# Patient Record
Sex: FEMALE | Race: OTHER | Employment: FULL TIME | URBAN - METROPOLITAN AREA
[De-identification: names, ages, dates, MRNs, and addresses within clinical notes are randomized per-mention and may not be internally consistent; named-entity substitution may affect disease eponyms.]

---

## 2017-12-31 ENCOUNTER — APPOINTMENT (OUTPATIENT)
Dept: GENERAL RADIOLOGY | Age: 57
End: 2017-12-31
Attending: EMERGENCY MEDICINE
Payer: COMMERCIAL

## 2017-12-31 ENCOUNTER — HOSPITAL ENCOUNTER (EMERGENCY)
Age: 57
Discharge: HOME | End: 2017-12-31
Attending: EMERGENCY MEDICINE
Payer: COMMERCIAL

## 2017-12-31 VITALS
WEIGHT: 205.8 LBS | HEIGHT: 60 IN | RESPIRATION RATE: 20 BRPM | OXYGEN SATURATION: 99 % | SYSTOLIC BLOOD PRESSURE: 145 MMHG | DIASTOLIC BLOOD PRESSURE: 75 MMHG | HEART RATE: 100 BPM | TEMPERATURE: 100 F | BODY MASS INDEX: 40.4 KG/M2

## 2017-12-31 DIAGNOSIS — J41.0 SIMPLE CHRONIC BRONCHITIS (HCC): Primary | ICD-10-CM

## 2017-12-31 LAB
ALBUMIN SERPL-MCNC: 3.9 G/DL (ref 3.5–5)
ALBUMIN/GLOB SERPL: 0.9 {RATIO} (ref 1.1–2.2)
ALP SERPL-CCNC: 108 U/L (ref 45–117)
ALT SERPL-CCNC: <6 U/L (ref 12–78)
ANION GAP SERPL CALC-SCNC: 10 MMOL/L (ref 5–15)
AST SERPL-CCNC: 41 U/L (ref 15–37)
BASOPHILS # BLD: 0 K/UL (ref 0–0.1)
BASOPHILS NFR BLD: 0 % (ref 0–1)
BILIRUB SERPL-MCNC: 0.4 MG/DL (ref 0.2–1)
BUN SERPL-MCNC: 11 MG/DL (ref 6–20)
BUN/CREAT SERPL: 14 (ref 12–20)
CALCIUM SERPL-MCNC: 8.9 MG/DL (ref 8.5–10.1)
CHLORIDE SERPL-SCNC: 101 MMOL/L (ref 97–108)
CO2 SERPL-SCNC: 22 MMOL/L (ref 21–32)
CREAT SERPL-MCNC: 0.76 MG/DL (ref 0.55–1.02)
DIFFERENTIAL METHOD BLD: ABNORMAL
EOSINOPHIL # BLD: 0.2 K/UL (ref 0–0.4)
EOSINOPHIL NFR BLD: 3 % (ref 0–7)
ERYTHROCYTE [DISTWIDTH] IN BLOOD BY AUTOMATED COUNT: 13.9 % (ref 11.5–14.5)
FLUAV AG NPH QL IA: NEGATIVE
FLUBV AG NOSE QL IA: NEGATIVE
GLOBULIN SER CALC-MCNC: 4.5 G/DL (ref 2–4)
GLUCOSE SERPL-MCNC: 98 MG/DL (ref 65–100)
HCT VFR BLD AUTO: 44 % (ref 35–47)
HGB BLD-MCNC: 14.7 G/DL (ref 11.5–16)
LYMPHOCYTES # BLD: 0.5 K/UL (ref 0.8–3.5)
LYMPHOCYTES NFR BLD: 7 % (ref 12–49)
MCH RBC QN AUTO: 29 PG (ref 26–34)
MCHC RBC AUTO-ENTMCNC: 33.4 G/DL (ref 30–36.5)
MCV RBC AUTO: 86.8 FL (ref 80–99)
MONOCYTES # BLD: 0.7 K/UL (ref 0–1)
MONOCYTES NFR BLD: 10 % (ref 5–13)
NEUTS SEG # BLD: 5.7 K/UL (ref 1.8–8)
NEUTS SEG NFR BLD: 80 % (ref 32–75)
PLATELET # BLD AUTO: 204 K/UL (ref 150–400)
POTASSIUM SERPL-SCNC: 4 MMOL/L (ref 3.5–5.1)
PROT SERPL-MCNC: 8.4 G/DL (ref 6.4–8.2)
RBC # BLD AUTO: 5.07 M/UL (ref 3.8–5.2)
RBC MORPH BLD: ABNORMAL
SODIUM SERPL-SCNC: 133 MMOL/L (ref 136–145)
WBC # BLD AUTO: 7.1 K/UL (ref 3.6–11)

## 2017-12-31 PROCEDURE — 74011250637 HC RX REV CODE- 250/637: Performed by: EMERGENCY MEDICINE

## 2017-12-31 PROCEDURE — 94640 AIRWAY INHALATION TREATMENT: CPT

## 2017-12-31 PROCEDURE — 71020 XR CHEST PA LAT: CPT

## 2017-12-31 PROCEDURE — 85025 COMPLETE CBC W/AUTO DIFF WBC: CPT | Performed by: EMERGENCY MEDICINE

## 2017-12-31 PROCEDURE — 74011000250 HC RX REV CODE- 250: Performed by: EMERGENCY MEDICINE

## 2017-12-31 PROCEDURE — 36415 COLL VENOUS BLD VENIPUNCTURE: CPT | Performed by: EMERGENCY MEDICINE

## 2017-12-31 PROCEDURE — 74011636637 HC RX REV CODE- 636/637: Performed by: EMERGENCY MEDICINE

## 2017-12-31 PROCEDURE — 80053 COMPREHEN METABOLIC PANEL: CPT | Performed by: EMERGENCY MEDICINE

## 2017-12-31 PROCEDURE — 77030013140 HC MSK NEB VYRM -A

## 2017-12-31 PROCEDURE — 99283 EMERGENCY DEPT VISIT LOW MDM: CPT

## 2017-12-31 PROCEDURE — 87804 INFLUENZA ASSAY W/OPTIC: CPT | Performed by: EMERGENCY MEDICINE

## 2017-12-31 RX ORDER — ACETAMINOPHEN 325 MG/1
975 TABLET ORAL
Status: COMPLETED | OUTPATIENT
Start: 2017-12-31 | End: 2017-12-31

## 2017-12-31 RX ORDER — AZITHROMYCIN 250 MG/1
500 TABLET, FILM COATED ORAL
Status: COMPLETED | OUTPATIENT
Start: 2017-12-31 | End: 2017-12-31

## 2017-12-31 RX ORDER — AZITHROMYCIN 250 MG/1
250 TABLET, FILM COATED ORAL DAILY
Qty: 4 TAB | Refills: 0 | Status: SHIPPED | OUTPATIENT
Start: 2017-12-31 | End: 2018-01-04

## 2017-12-31 RX ORDER — PREDNISONE 20 MG/1
60 TABLET ORAL DAILY
Qty: 15 TAB | Refills: 0 | Status: SHIPPED | OUTPATIENT
Start: 2017-12-31 | End: 2018-01-05

## 2017-12-31 RX ORDER — PREDNISONE 20 MG/1
60 TABLET ORAL
Status: COMPLETED | OUTPATIENT
Start: 2017-12-31 | End: 2017-12-31

## 2017-12-31 RX ADMIN — PREDNISONE 60 MG: 20 TABLET ORAL at 15:32

## 2017-12-31 RX ADMIN — ACETAMINOPHEN 975 MG: 325 TABLET ORAL at 13:33

## 2017-12-31 RX ADMIN — ALBUTEROL SULFATE 1 DOSE: 2.5 SOLUTION RESPIRATORY (INHALATION) at 16:25

## 2017-12-31 RX ADMIN — ALBUTEROL SULFATE 1 DOSE: 2.5 SOLUTION RESPIRATORY (INHALATION) at 15:52

## 2017-12-31 RX ADMIN — ALBUTEROL SULFATE 1 DOSE: 2.5 SOLUTION RESPIRATORY (INHALATION) at 16:09

## 2017-12-31 RX ADMIN — AZITHROMYCIN 500 MG: 250 TABLET, FILM COATED ORAL at 16:00

## 2017-12-31 NOTE — DISCHARGE INSTRUCTIONS
Bronquitis: Instrucciones de cuidado - [ Bronchitis: Care Instructions ]  Instrucciones de cuidado    La bronquitis es filippo inflamación de los bronquios (conductos bronquiales), que llevan aire a los pulmones. Los tubos se hinchan y producen mucosidad, o flema. La mucosidad y los bronquios inflamados hacen que tosa. Es posible que tenga problemas para respirar. Blake Pears de los casos de bronquitis son causados por virus lola los que causan los resfriados. Los antibióticos generalmente no ayudan y pueden ser dañinos. La bronquitis suele aparecer con Kevin Mariluz y dura alrededor de 2 a 3 semanas en personas que por lo demás son saludables. La atención de seguimiento es filippo parte clave de paul tratamiento y seguridad. Asegúrese de hacer y acudir a todas las citas, y llame a paul médico si está teniendo problemas. También es filippo buena idea saber los resultados de los exámenes y mantener filippo lista de los medicamentos que kandi. ¿Cómo puede cuidarse en el hogar? · [employer] medicamentos exactamente lola le fueron recetados. Llame a paul médico si patience que está teniendo un problema con kvng medicamentos. · Descanse un poco más. · [place] un analgésico (medicamento para el dolor) de venta hong, lola acetaminofén (Tylenol), ibuprofeno (Advil, Motrin) o naproxeno (Aleve), para reducir la fiebre y [employer] licha en el cuerpo. [place] y siga todas las instrucciones de la [place]. · No tome dos o más analgésicos al mismo tiempo a menos que el médico se lo haya indicado. Muchos analgésicos contienen acetaminofén, es decir, Tylenol. El exceso de acetaminofén (Tylenol) puede ser dañino. · [place] un medicamento para la tos de venta hong que contenga dextrometorfano para ayudar a aliviar la tos seca y persistente y así poder dormir. Evite los medicamentos para la tos que tengan más de un ingrediente Barco. [place] y siga todas las instrucciones de la [place].   · Respire aire húmedo de un humidificador, ducha caliente o lavabo lleno de [place]. El calor y la humedad adelgazarán la mucosidad para que pueda expulsarla. · No fume. Fumar puede empeorar la bronquitis. Si necesita ayuda para dejar de fumar, hable con paul médico sobre programas y medicamentos para dejar de fumar. Estos pueden aumentar kvng probabilidades de dejar el hábito para siempre. ¿Cuándo debe pedir ayuda? Llame al 911 en cualquier momento que considere que necesita atención de emergencia. Por ejemplo, llame si:  ? · [address] dificultades para respirar. ? Llame a paul médico ahora mismo o busque atención médica inmediata si:  ? · Tiene nueva o peor dificultad para respirar. ? · Tose mucosidad (esputo) de color café oscuro o con annie. ? · Tiene filippo fiebre nueva o más dee dee. ? · Tiene un salpullido nuevo. ?Preste especial atención a los cambios en paul marla y asegúrese de comunicarse con paul médico si:  ? · Paul tos es más profunda o más frecuente que antes, especialmente si nota más mucosidad o un cambio en el color de la mucosidad. ? · No mejora lola se esperaba. ¿Dónde puede encontrar más información en inglés? Anahi Andino a [email or web address]-leanne.info/. Escriba H333 en la búsqueda para aprender más acerca de \"Bronquitis: Instrucciones de cuidado - [ Bronchitis: Care Instructions ]. \"  Revisado: 12 brock, 2017  Versión del contenido: 11.4  © 5784-3375 [employer], [employer]. Las instrucciones de cuidado fueron adaptadas bajo licencia por [employer] (which disclaims liability or warranty for this information). Si usted tiene [place] afección médica o sobre estas instrucciones, siempre pregunte a paul profesional de marla. [facility] niega toda garantía o responsabilidad por paul uso de esta información.

## 2017-12-31 NOTE — ED TRIAGE NOTES
Triage:  Pt to ED due to concerns over continued complaints of cough, body aches, fever/chills. Pt states symptoms have been worsening over the past several days. Pt ambulatory into triage with steady gait, Pt has blanked wrapped around her shoulders due to chills.

## 2017-12-31 NOTE — ED PROVIDER NOTES
HPI Comments: 62 y.o. female with past medical history significant for high cholesterol, asthma, and HTN who presents from home with chief complaint of cough. Patient states that she has been having a painful cough with white colored production, SOB, and fever. She was reportedly seen at [facility] 1 month ago and received medication for \"bronchitis symptoms\". Her  states that she got better after taking the medication but reports that she became sick again a few days ago. Patient was prescribed an inhaler which she is supposed to use 3x/day. However patient's  states that she \"sometimes forgets\" to use it. Patient denies having any nausea, vomiting, or diarrhea. There are no other acute medical concerns at this time. Social hx: nonsmoker, no EtOH use, no drug use  PCP: None    Note written by Elenita Robert, as dictated by Kyung Corenjo MD 1:59 PM      The history is provided by the patient. No  was used. Past Medical History:   Diagnosis Date    Asthma     High cholesterol     HTN (hypertension)        Past Surgical History:   Procedure Laterality Date    HX  SECTION           History reviewed. No pertinent family history. Social History     Social History    Marital status:      Spouse name: N/A    Number of children: N/A    Years of education: N/A     Occupational History    Not on file. Social History Main Topics    Smoking status: Never Smoker    Smokeless tobacco: Never Used    Alcohol use No    Drug use: No    Sexual activity: Not on file     Other Topics Concern    Not on file     Social History Narrative    No narrative on file         ALLERGIES: Review of patient's allergies indicates no known allergies. Review of Systems   Constitutional: Positive for fever. Negative for activity change and chills. HENT: Negative for nosebleeds, sore throat, trouble swallowing and voice change.     Eyes: Negative for visual disturbance. Respiratory: Positive for cough and shortness of breath. Cardiovascular: Negative for chest pain and palpitations. Gastrointestinal: Negative for abdominal pain, constipation, diarrhea and nausea. Genitourinary: Negative for difficulty urinating, dysuria, hematuria and urgency. Musculoskeletal: Negative for back pain, neck pain and neck stiffness. Skin: Negative for color change. Allergic/Immunologic: Negative for immunocompromised state. Neurological: Negative for dizziness, seizures, syncope, weakness, light-headedness, numbness and headaches. Psychiatric/Behavioral: Negative for behavioral problems, confusion, hallucinations, self-injury and suicidal ideas. All other systems reviewed and are negative. Vitals:    12/31/17 1226   BP: 149/73   Pulse: (!) 108   Resp: 22   Temp: (!) 102.2 °F (39 °C)   SpO2: 96%   Weight: 93.4 kg (205 lb 12.8 oz)   Height: 5' (1.524 m)            Physical Exam   Constitutional: She is oriented to person, place, and time. She appears well-developed and well-nourished. No distress. HENT:   Head: Normocephalic and atraumatic. Eyes: Pupils are equal, round, and reactive to light. Neck: Normal range of motion. Neck supple. Cardiovascular: Normal rate, regular rhythm and normal heart sounds. Exam reveals no gallop and no friction rub. No murmur heard. Pulmonary/Chest: Effort normal. No respiratory distress. She has wheezes. Shallow inspiration   Abdominal: Soft. Bowel sounds are normal. She exhibits no distension. There is no tenderness. There is no rebound and no guarding. Musculoskeletal: Normal range of motion. Neurological: She is alert and oriented to person, place, and time. Skin: Skin is warm. No rash noted. She is not diaphoretic. Psychiatric: She has a normal mood and affect. Her behavior is normal. Judgment and thought content normal.   Nursing note and vitals reviewed.   Note written by Elenita Victor, as dictated by Ryland Carl MD 1:59 PM    [facility]  ED Course   This is a 79-year-old female past medical history, review of systems, physical exam as above, presenting with complaints of painful cough, rhinorrhea, body aches, noted to be febrile, tachycardic in triage. Patient endorses through  that she typically has similar upper respiratory infections and newly, states she had similar symptoms approximately one month ago, that resolved with conservative therapy, states she's been taking an albuterol inhaler with little relief physical exam unremarkable for obese female, in no acute distress, with diffuse wheezes, noted to be satting well while on room air, tachycardia. Patient received lab work, chest x-ray while in triage, that are unremarkable. Plan to provide antipyretics, stacked nebulizers, oral prednisone, and we'll reassess for disposition.       Procedures  PROGRESS NOTE:  3:48 PM Transferred care of patient to Amari Brian MD

## 2018-05-15 ENCOUNTER — APPOINTMENT (OUTPATIENT)
Dept: MRI IMAGING | Age: 58
End: 2018-05-15
Attending: EMERGENCY MEDICINE
Payer: COMMERCIAL

## 2018-05-15 ENCOUNTER — APPOINTMENT (OUTPATIENT)
Dept: GENERAL RADIOLOGY | Age: 58
End: 2018-05-15
Attending: EMERGENCY MEDICINE
Payer: COMMERCIAL

## 2018-05-15 ENCOUNTER — HOSPITAL ENCOUNTER (OUTPATIENT)
Age: 58
Setting detail: OBSERVATION
Discharge: HOME | End: 2018-05-16
Attending: STUDENT IN AN ORGANIZED HEALTH CARE EDUCATION/TRAINING PROGRAM | Admitting: INTERNAL MEDICINE
Payer: COMMERCIAL

## 2018-05-15 ENCOUNTER — APPOINTMENT (OUTPATIENT)
Dept: CT IMAGING | Age: 58
End: 2018-05-15
Attending: EMERGENCY MEDICINE
Payer: COMMERCIAL

## 2018-05-15 DIAGNOSIS — R51.9 NONINTRACTABLE HEADACHE, UNSPECIFIED CHRONICITY PATTERN, UNSPECIFIED HEADACHE TYPE: Primary | ICD-10-CM

## 2018-05-15 DIAGNOSIS — M54.2 NECK PAIN: ICD-10-CM

## 2018-05-15 LAB
ALBUMIN SERPL-MCNC: 3.8 G/DL (ref 3.5–5)
ALBUMIN/GLOB SERPL: 0.9 {RATIO} (ref 1.1–2.2)
ALP SERPL-CCNC: 98 U/L (ref 45–117)
ALT SERPL-CCNC: 7 U/L (ref 12–78)
ANION GAP SERPL CALC-SCNC: 10 MMOL/L (ref 5–15)
APPEARANCE UR: CLEAR
AST SERPL-CCNC: 24 U/L (ref 15–37)
BACTERIA URNS QL MICRO: NEGATIVE /HPF
BASOPHILS # BLD: 0 K/UL (ref 0–0.1)
BASOPHILS NFR BLD: 0 % (ref 0–1)
BILIRUB SERPL-MCNC: 0.3 MG/DL (ref 0.2–1)
BILIRUB UR QL: NEGATIVE
BUN SERPL-MCNC: 16 MG/DL (ref 6–20)
BUN/CREAT SERPL: 21 (ref 12–20)
CALCIUM SERPL-MCNC: 8.9 MG/DL (ref 8.5–10.1)
CHLORIDE SERPL-SCNC: 102 MMOL/L (ref 97–108)
CO2 SERPL-SCNC: 27 MMOL/L (ref 21–32)
COLOR UR: ABNORMAL
CREAT SERPL-MCNC: 0.76 MG/DL (ref 0.55–1.02)
DIFFERENTIAL METHOD BLD: ABNORMAL
EOSINOPHIL # BLD: 0.4 K/UL (ref 0–0.4)
EOSINOPHIL NFR BLD: 5 % (ref 0–7)
EPITH CASTS URNS QL MICRO: ABNORMAL /LPF
ERYTHROCYTE [DISTWIDTH] IN BLOOD BY AUTOMATED COUNT: 12.9 % (ref 11.5–14.5)
GLOBULIN SER CALC-MCNC: 4.1 G/DL (ref 2–4)
GLUCOSE SERPL-MCNC: 198 MG/DL (ref 65–100)
GLUCOSE UR STRIP.AUTO-MCNC: NEGATIVE MG/DL
HCT VFR BLD AUTO: 39.8 % (ref 35–47)
HGB BLD-MCNC: 13 G/DL (ref 11.5–16)
HGB UR QL STRIP: ABNORMAL
HYALINE CASTS URNS QL MICRO: ABNORMAL /LPF (ref 0–5)
IMM GRANULOCYTES # BLD: 0 K/UL (ref 0–0.04)
IMM GRANULOCYTES NFR BLD AUTO: 1 % (ref 0–0.5)
KETONES UR QL STRIP.AUTO: NEGATIVE MG/DL
LEUKOCYTE ESTERASE UR QL STRIP.AUTO: ABNORMAL
LIPASE SERPL-CCNC: 70 U/L (ref 73–393)
LYMPHOCYTES # BLD: 1.9 K/UL (ref 0.8–3.5)
LYMPHOCYTES NFR BLD: 27 % (ref 12–49)
MCH RBC QN AUTO: 28.9 PG (ref 26–34)
MCHC RBC AUTO-ENTMCNC: 32.7 G/DL (ref 30–36.5)
MCV RBC AUTO: 88.4 FL (ref 80–99)
MONOCYTES # BLD: 0.4 K/UL (ref 0–1)
MONOCYTES NFR BLD: 6 % (ref 5–13)
NEUTS SEG # BLD: 4.4 K/UL (ref 1.8–8)
NEUTS SEG NFR BLD: 61 % (ref 32–75)
NITRITE UR QL STRIP.AUTO: NEGATIVE
NRBC # BLD: 0 K/UL (ref 0–0.01)
NRBC BLD-RTO: 0 PER 100 WBC
PH UR STRIP: 6 [PH] (ref 5–8)
PLATELET # BLD AUTO: 260 K/UL (ref 150–400)
PMV BLD AUTO: 9.6 FL (ref 8.9–12.9)
POTASSIUM SERPL-SCNC: 3.9 MMOL/L (ref 3.5–5.1)
PROT SERPL-MCNC: 7.9 G/DL (ref 6.4–8.2)
PROT UR STRIP-MCNC: NEGATIVE MG/DL
RBC # BLD AUTO: 4.5 M/UL (ref 3.8–5.2)
RBC #/AREA URNS HPF: ABNORMAL /HPF (ref 0–5)
SODIUM SERPL-SCNC: 139 MMOL/L (ref 136–145)
SP GR UR REFRACTOMETRY: 1 (ref 1–1.03)
UA: UC IF INDICATED,UAUC: ABNORMAL
UROBILINOGEN UR QL STRIP.AUTO: 0.2 EU/DL (ref 0.2–1)
WBC # BLD AUTO: 7.1 K/UL (ref 3.6–11)
WBC URNS QL MICRO: ABNORMAL /HPF (ref 0–4)

## 2018-05-15 PROCEDURE — 74011250636 HC RX REV CODE- 250/636: Performed by: EMERGENCY MEDICINE

## 2018-05-15 PROCEDURE — 81001 URINALYSIS AUTO W/SCOPE: CPT | Performed by: EMERGENCY MEDICINE

## 2018-05-15 PROCEDURE — 85025 COMPLETE CBC W/AUTO DIFF WBC: CPT | Performed by: EMERGENCY MEDICINE

## 2018-05-15 PROCEDURE — 96374 THER/PROPH/DIAG INJ IV PUSH: CPT

## 2018-05-15 PROCEDURE — 36415 COLL VENOUS BLD VENIPUNCTURE: CPT | Performed by: EMERGENCY MEDICINE

## 2018-05-15 PROCEDURE — 70450 CT HEAD/BRAIN W/O DYE: CPT

## 2018-05-15 PROCEDURE — 72141 MRI NECK SPINE W/O DYE: CPT

## 2018-05-15 PROCEDURE — 96361 HYDRATE IV INFUSION ADD-ON: CPT

## 2018-05-15 PROCEDURE — 83690 ASSAY OF LIPASE: CPT | Performed by: EMERGENCY MEDICINE

## 2018-05-15 PROCEDURE — 74011000250 HC RX REV CODE- 250: Performed by: EMERGENCY MEDICINE

## 2018-05-15 PROCEDURE — 99285 EMERGENCY DEPT VISIT HI MDM: CPT

## 2018-05-15 PROCEDURE — 99218 HC RM OBSERVATION: CPT

## 2018-05-15 PROCEDURE — 72050 X-RAY EXAM NECK SPINE 4/5VWS: CPT

## 2018-05-15 PROCEDURE — 74011250637 HC RX REV CODE- 250/637: Performed by: INTERNAL MEDICINE

## 2018-05-15 PROCEDURE — 80053 COMPREHEN METABOLIC PANEL: CPT | Performed by: EMERGENCY MEDICINE

## 2018-05-15 PROCEDURE — 96375 TX/PRO/DX INJ NEW DRUG ADDON: CPT

## 2018-05-15 RX ORDER — ROSUVASTATIN CALCIUM 20 MG/1
20 TABLET, COATED ORAL
COMMUNITY

## 2018-05-15 RX ORDER — SODIUM CHLORIDE 0.9 % (FLUSH) 0.9 %
5-10 SYRINGE (ML) INJECTION AS NEEDED
Status: DISCONTINUED | OUTPATIENT
Start: 2018-05-15 | End: 2018-05-16 | Stop reason: HOSPADM

## 2018-05-15 RX ORDER — FENTANYL CITRATE 50 UG/ML
12.5 INJECTION, SOLUTION INTRAMUSCULAR; INTRAVENOUS
Status: DISCONTINUED | OUTPATIENT
Start: 2018-05-15 | End: 2018-05-16 | Stop reason: HOSPADM

## 2018-05-15 RX ORDER — ALBUTEROL SULFATE 90 UG/1
1 AEROSOL, METERED RESPIRATORY (INHALATION)
COMMUNITY

## 2018-05-15 RX ORDER — VALSARTAN AND HYDROCHLOROTHIAZIDE 80; 12.5 MG/1; MG/1
1 TABLET, FILM COATED ORAL DAILY
COMMUNITY

## 2018-05-15 RX ORDER — ACETAMINOPHEN 325 MG/1
650 TABLET ORAL
Status: DISCONTINUED | OUTPATIENT
Start: 2018-05-15 | End: 2018-05-16 | Stop reason: HOSPADM

## 2018-05-15 RX ORDER — SODIUM CHLORIDE 0.9 % (FLUSH) 0.9 %
5-10 SYRINGE (ML) INJECTION EVERY 8 HOURS
Status: DISCONTINUED | OUTPATIENT
Start: 2018-05-15 | End: 2018-05-16 | Stop reason: HOSPADM

## 2018-05-15 RX ORDER — SIMVASTATIN 20 MG/1
20 TABLET, FILM COATED ORAL
Status: DISCONTINUED | OUTPATIENT
Start: 2018-05-15 | End: 2018-05-16 | Stop reason: HOSPADM

## 2018-05-15 RX ORDER — CYCLOBENZAPRINE HCL 5 MG
5 TABLET ORAL
COMMUNITY

## 2018-05-15 RX ORDER — FLUTICASONE PROPIONATE 50 MCG
2 SPRAY, SUSPENSION (ML) NASAL
COMMUNITY

## 2018-05-15 RX ORDER — DIPHENHYDRAMINE HYDROCHLORIDE 50 MG/ML
12.5 INJECTION, SOLUTION INTRAMUSCULAR; INTRAVENOUS
Status: COMPLETED | OUTPATIENT
Start: 2018-05-15 | End: 2018-05-15

## 2018-05-15 RX ORDER — NABUMETONE 500 MG/1
500 TABLET, FILM COATED ORAL
COMMUNITY

## 2018-05-15 RX ORDER — KETOROLAC TROMETHAMINE 30 MG/ML
15 INJECTION, SOLUTION INTRAMUSCULAR; INTRAVENOUS EVERY 6 HOURS
Status: DISCONTINUED | OUTPATIENT
Start: 2018-05-15 | End: 2018-05-16 | Stop reason: HOSPADM

## 2018-05-15 RX ORDER — LEVOCETIRIZINE DIHYDROCHLORIDE 5 MG/1
5 TABLET, FILM COATED ORAL
COMMUNITY

## 2018-05-15 RX ORDER — BENZONATATE 200 MG/1
200 CAPSULE ORAL
COMMUNITY

## 2018-05-15 RX ORDER — KETOROLAC TROMETHAMINE 30 MG/ML
30 INJECTION, SOLUTION INTRAMUSCULAR; INTRAVENOUS
Status: COMPLETED | OUTPATIENT
Start: 2018-05-15 | End: 2018-05-15

## 2018-05-15 RX ORDER — ONDANSETRON 2 MG/ML
4 INJECTION INTRAMUSCULAR; INTRAVENOUS
Status: DISCONTINUED | OUTPATIENT
Start: 2018-05-15 | End: 2018-05-16 | Stop reason: HOSPADM

## 2018-05-15 RX ADMIN — DIPHENHYDRAMINE HYDROCHLORIDE 12.5 MG: 50 INJECTION, SOLUTION INTRAMUSCULAR; INTRAVENOUS at 11:19

## 2018-05-15 RX ADMIN — METHYLPREDNISOLONE SODIUM SUCCINATE 125 MG: 125 INJECTION, POWDER, FOR SOLUTION INTRAMUSCULAR; INTRAVENOUS at 11:18

## 2018-05-15 RX ADMIN — SODIUM CHLORIDE 1000 ML: 900 INJECTION, SOLUTION INTRAVENOUS at 11:20

## 2018-05-15 RX ADMIN — KETOROLAC TROMETHAMINE 30 MG: 30 INJECTION, SOLUTION INTRAMUSCULAR; INTRAVENOUS at 11:19

## 2018-05-15 RX ADMIN — SODIUM CHLORIDE 10 MG: 9 INJECTION INTRAMUSCULAR; INTRAVENOUS; SUBCUTANEOUS at 11:19

## 2018-05-15 NOTE — ED NOTES
Report given to receiving RN at this time. Pt  De Eugenio) phone number [phone]. Please call if wife needs anything.

## 2018-05-15 NOTE — ED PROVIDER NOTES
HPI Comments: 77-year-old female presenting to the emergency room for a duration of headache and neck pain. Pain is been present for 6 days. Patient denies fever or chills. No abdominal pain nausea or vomiting. She does report mild photophobia. No chest pain or shortness of breath. No cough, congestion, rhinitis or sore throats. No blurry vision or double vision. Patient was at work when the pain began. Headache will go away for a few minutes but then returned. She has seen her primary care doctor has prescribed multiple medicines that provided minimal relief. Pt has tried vaPatient does have a headache history. No headaches have lasted this long. Social hx      Patient is a 62 y.o. female presenting with headaches and sore throat. The history is provided by the patient and the spouse. Headache    Associated symptoms include nausea. Pertinent negatives include no fever, no palpitations, no shortness of breath, no dizziness and no vomiting. Sore Throat    Associated symptoms include headaches. Pertinent negatives include no diarrhea, no vomiting, no congestion, no shortness of breath and no cough. Past Medical History:   Diagnosis Date    Asthma     High cholesterol     HTN (hypertension)     Hypertension        Past Surgical History:   Procedure Laterality Date    HX  SECTION           Family History:   Problem Relation Age of Onset   [employer] Asthma Mother     Diabetes Sister        Social History     Social History    Marital status:      Spouse name: N/A    Number of children: N/A    Years of education: N/A     Occupational History    Not on file.      Social History Main Topics    Smoking status: Never Smoker    Smokeless tobacco: Never Used    Alcohol use No    Drug use: No    Sexual activity: Not on file     Other Topics Concern    Not on file     Social History Narrative    ** Merged History Encounter **              ALLERGIES: Review of patient's allergies indicates no known allergies. Review of Systems   Constitutional: Negative for chills and fever. HENT: Negative for congestion and sore throat. Eyes: Positive for photophobia. Negative for visual disturbance. Respiratory: Negative for cough and shortness of breath. Cardiovascular: Negative for chest pain and palpitations. Gastrointestinal: Positive for nausea. Negative for abdominal pain, blood in stool, diarrhea and vomiting. Genitourinary: Negative for dysuria, flank pain, frequency and urgency. Musculoskeletal: Positive for neck pain. Negative for back pain, myalgias and neck stiffness. Skin: Negative for rash and wound. Neurological: Positive for headaches. Negative for dizziness and numbness. All other systems reviewed and are negative. Vitals:    05/15/18 1022   BP: 150/79   Pulse: 93   Resp: 16   Temp: 97.7 °F (36.5 °C)   SpO2: 98%   Weight: 91.7 kg (202 lb 3.2 oz)   Height: 5' (1.524 m)            Physical Exam   Constitutional: She is oriented to person, place, and time. She appears well-developed and well-nourished. No distress. HENT:   Head: Normocephalic and atraumatic. Nose: Nose normal.   Mouth/Throat: Oropharynx is clear and moist. No oropharyngeal exudate. Eyes: Conjunctivae and EOM are normal. Pupils are equal, round, and reactive to light. Neck: Normal range of motion. Neck supple. No meningeal signs. Cardiovascular: Normal rate, regular rhythm and normal heart sounds. Pulmonary/Chest: Effort normal and breath sounds normal. No respiratory distress. Abdominal: Soft. Bowel sounds are normal. She exhibits no mass. There is no tenderness. There is no rebound and no guarding. Musculoskeletal: Normal range of motion. She exhibits no edema or tenderness. 5/5  strength bilaterally  5/5 flexion/extension of hips bilaterally   Neurological: She is alert and oriented to person, place, and time. She has normal reflexes. No cranial nerve deficit.  She exhibits normal muscle tone. Coordination normal.     2+ ac, 2+ patellar reflexes bilaterally    Cranial Nerves:  I: smell  Not tested   II: pupils  Equal, round, reactive to light   III,VII: ptosis  none   III,IV,VI: extraocular muscles   normal   V: mastication  normal   V: facial light touch sensation   normal   VII: facial muscle function   symmetric   VIII: hearing  symmetric   IX: soft palate elevation   normal   XI: sternocleidomastoid strength  5/5   XII: tongue   midline          Skin: Skin is warm and dry. No rash noted. No erythema. Psychiatric: She has a normal mood and affect. Her behavior is normal. Judgment and thought content normal.   Nursing note and vitals reviewed. MDM  Number of Diagnoses or Management Options  Neck pain:   Nonintractable headache, unspecified chronicity pattern, unspecified headache type:   Diagnosis management comments: 54-year-old female presenting for headache and neck pain. She is afebrile. She appears to be in pain but no acute distress. Painful range of motion of the neck. No neurological deficits on exam.   Labs and CT had been ordered triage. ED Course       Procedures    CT head demonstrate a 9 mm extra-axial ossified lesion in the right frontal lobe potentially representing a small calcified meningioma. There is mass effect upon the left brainstem/cervical cord related to degenerative changes at the C1-2 level. CONSULT NOTE:  2:55 PM Bud Pinto PA-C spoke with Dr. Ciera Maxwell, Consult for Neurosurgery. Discussed available diagnostic tests and clinical findings. Dr. Ciera Maxwell recommends obtaining MRI cervical spine. Admit to hospitalist service and he will participate in patient's care in consultation. 3:05 PM  Discussed available lab and imaging results with patient via [employer]  phones. She verbalizes understanding and agrees with care plan. Will admit patient to hospitalist service for further evaluation and treatment.      CONSULT NOTE:  3:18 PM Leonardo Elizabeth PA-C spoke with Dr. Denisha Hamilton, consult for Hospitalist, via [facility] Text. Discussed available diagnostic tests and clinical findings. Dr. Denisha aHmilton will evaluate the patient for possible admission. Patient is being admitted to the hospital.  The results of their tests and reasons for their admission have been discussed with them and/or available family. They convey agreement and understanding for the need to be admitted and for their admission diagnosis. Consultation will be made now with the inpatient physician for hospitalization.

## 2018-05-15 NOTE — H&P
[address] [facility]  HISTORY AND PHYSICAL      Jennifer Ley  MR#: [id number]  : 1960  ACCOUNT #: [de-identified]   ADMIT DATE: 05/15/2018    PRIMARY CARE PHYSICIAN:  Dominique Potter MD.    HISTORY OF PRESENT ILLNESS:  A 26-year-old female who is presenting to the ER because she is complaining of headache all over the head with the neck pain as well. This is going on for almost a week. It is not associated with any fever, photophobia, nausea, vomiting, diarrhea. No cough. No chest pain. She has also got prescription for multiple medications now. She has a rash on the chest.  She did not give me any more history other than this. Headache is present all day and she is also having some sore throat with it. PAST MEDICAL HISTORY:  Positive for asthma, high cholesterol, hypertension. PAST SURGICAL HISTORY:  . FAMILY HISTORY:  Asthma and diabetes. SOCIAL HISTORY:  She is . She is a nonsmoker. REVIEW OF SYSTEMS:  Positive for sore throat and headache all over. VITAL SIGNS:  Blood pressure 150/79, pulse is 93, temperature 97.7. MEDICATIONS:  Prior to admission medication list is as follows: The patient takes Tessalon 200 three times daily, Flexeril 5 mg twice as needed for muscle spasms and levocetirizine at night and Relafen 500 mg twice daily for pain, Diovan HCT one tablet daily. LABORATORY DATA AND IMAGING:  WBC 7.1, hemoglobin is 13 and platelets are 720. Sodium is 139, BUN is 16, potassium 3.9, calcium 8.9, lipase of 70. CT of the head which was done in the ER shows that the patient has a mass effect on the left brainstem, cervical cause related to the degenerative changes of the C1-C2 level. FAMILY HISTORY:  Asthma and diabetes in the mother and sister. ASSESSMENT AND PLAN:  This is a 26-year-old female with hypertension, presenting to the hospital because of headaches. No fever, no photophobia.     1.  She has a mass effect of the brain because of degenerative changes of cervical cause. Neurosurgery has been consulted for the same. We would admit her to the neuro tele floor for neuro checks. At the same time the MRI will be done, the neurosurgery will determine the next. We will follow along. 2.  Hypertension. Continue prior to admission medication. 3.  Rash on the chest.  Would watch her for now, do not know exactly the reason for the rash. 4. Muscle spasm, on Flexeril here. On behalf of hospitalist team  of this patient.       MD DEVAN Ayala/CHARO  D: 05/15/2018 16:07     T: 05/15/2018 16:29  JOB #: [id number]

## 2018-05-15 NOTE — ED NOTES
Bedside and Verbal shift change report given to Karin Kerns RN (oncoming nurse) by Robert Mak RN (offgoing nurse). Report included the following information SBAR, Kardex and ED Summary.

## 2018-05-15 NOTE — ED NOTES
Bedside and Verbal shift change report given to Zion Bello RN (oncoming nurse) by Mack Willingham RN (offgoing nurse). Report included the following information SBAR, Kardex, Intake/Output, MAR and Recent Results.

## 2018-05-15 NOTE — ED NOTES
Pt back from CT. Dr. Kenji Hooper attempted to see pt earlier but pt was in CT. Dr. Kenji Hooper paged to let him know that pt is back.

## 2018-05-15 NOTE — ED TRIAGE NOTES
Pt c/o of posterior headache and neck pain x 6 days. States saw PCP on 5/9/18 and started on 5 different medications without relief: valsartan, benzonatate, flexeril, nabumetone, levocetirizine.    Rash to upper chest. +sore throat +cough

## 2018-05-15 NOTE — IP AVS SNAPSHOT
2700 [facility] [address] [place] [address] 
[phone] Patient: Joy Lopez MRN: [id number] :1960 About your hospitalization You were admitted on:  May 15, 2018 You last received care in the:  [facility] 6S NEURO-SCI TELE You were discharged on:  May 16, 2018 Why you were hospitalized Your primary diagnosis was:  Not on File Your diagnoses also included:  Head Ache Follow-up Information Follow up With Details Comments Contact Info Syed Jamison MD   [address] 1007 [facility] 
[phone] Renate Armenta MD Schedule an appointment as soon as possible for a visit To discuss possible surgery [facility]. [address] [place] [address] 
[phone] Discharge Orders None A check crispin indicates which time of day the medication should be taken. My Medications CONTINUE taking these medications Instructions Each Dose to Equal  
 Morning Noon Evening Bedtime  
 albuterol 90 mcg/actuation inhaler Commonly known as:  PROVENTIL HFA, VENTOLIN HFA, PROAIR HFA Your last dose was: Your next dose is: Take 1 Puff by inhalation every four (4) hours as needed for Wheezing. 1 Puff  
    
   
   
   
  
 benzonatate 200 mg capsule Commonly known as:  TESSALON Your last dose was: Your next dose is: Take 200 mg by mouth three (3) times daily as needed for Cough. 200 mg  
    
   
   
   
  
 CRESTOR 20 mg tablet Generic drug:  rosuvastatin Your last dose was: Your next dose is: Take 20 mg by mouth nightly. 20 mg  
    
   
   
   
  
 cyclobenzaprine 5 mg tablet Commonly known as:  FLEXERIL Your last dose was: Your next dose is: Take 5 mg by mouth two (2) times daily as needed for Muscle Spasm(s). 5 mg FLONASE ALLERGY RELIEF 50 mcg/actuation nasal spray Generic drug:  fluticasone Your last dose was: Your next dose is: 2 Sprays by Both Nostrils route daily as needed for Rhinitis. 2 Spray  
    
   
   
   
  
 nabumetone 500 mg tablet Commonly known as:  RELAFEN Your last dose was: Your next dose is: Take 500 mg by mouth two (2) times daily as needed for Pain. 500 mg  
    
   
   
   
  
 valsartan-hydroCHLOROthiazide 80-12.5 mg per tablet Commonly known as:  DIOVAN-HCT Your last dose was: Your next dose is: Take 1 Tab by mouth daily. 1 Tab XYZAL 5 mg tablet Generic drug:  levocetirizine Your last dose was: Your next dose is: Take 5 mg by mouth nightly. 5 mg Discharge Instructions Discharge Instructions PATIENT ID: Pennie Graves MRN: [id number] YOB: 1960 DATE OF ADMISSION: 5/15/2018 10:50 AM   
DATE OF DISCHARGE: 5/16/2018 PRIMARY CARE PROVIDER: Anamaria Farris MD  
 
ATTENDING PHYSICIAN: Dayna Miller MD 
DISCHARGING PROVIDER: Dayna Miller MD   
To contact this individual call [phone] and ask the  to page. If unavailable ask to be transferred the Adult Hospitalist Department. DISCHARGE DIAGNOSES Headache Hindbrain congenital abnormality with basilar invagination CONSULTATIONS: IP CONSULT TO NEUROSURGERY 
 
PROCEDURES/SURGERIES: * No surgery found * PENDING TEST RESULTS:  
At the time of discharge the following test results are still pending: none FOLLOW UP APPOINTMENTS:  
Follow-up Information Follow up With Details Comments Contact Info Anamaria Farris MD   [address] 
[phone] ADDITIONAL CARE RECOMMENDATIONS: Follow up with neurosurgery DIET: Regular Diet ACTIVITY: Activity as tolerated DISCHARGE MEDICATIONS: 
 See Medication Reconciliation Form · It is important that you take the medication exactly as they are prescribed. · Keep your medication in the bottles provided by the pharmacist and keep a list of the medication names, dosages, and times to be taken in your wallet. · Do not take other medications without consulting your doctor. NOTIFY YOUR PHYSICIAN FOR ANY OF THE FOLLOWING:  
Fever over 101 degrees for 24 hours. Chest pain, shortness of breath, fever, chills, nausea, vomiting, diarrhea, change in mentation, falling, weakness, bleeding. Severe pain or pain not relieved by medications. Or, any other signs or symptoms that you may have questions about. DISPOSITION: 
x  Home With: 
 OT  PT  [facility]  RN  
  
 SNF/Inpatient Rehab/LTAC Independent/assisted living Hospice Other:  
 
 
 
 
Signed:  
Jimmy Mcallister MD 
5/16/2018 
8:41 AM 
  
Dolor de espalda: Instrucciones de cuidado - [ Back Pain: Care Instructions ] Instrucciones de cuidado El dolor de espalda tiene muchas causas posibles. Con frecuencia, está relacionado con problemas en los músculos y ligamentos de la espalda. También podría estar relacionado con problemas de los nervios, discos o huesos de la espalda. Moverse, levantar algo, ponerse de pie, sentarse o dormir de [employer] incómoda pueden forzar la espalda. Algunas veces no se da cuenta de que tiene filippo lesión [employer] tarde. La artritis es otra causa común del dolor de espalda. Aunque james vez duela mucho, el dolor de espalda por lo general mejora por sí mismo en varias semanas. [address] personas se recuperan en 12 semanas o menos. Hacer un buen tratamiento en el hogar y tener cuidado de no forzar la espalda puede ayudar a que se sienta mejor más pronto. La atención de seguimiento es filippo parte clave de paul tratamiento y seguridad.  Asegúrese de hacer y acudir a todas las citas, y llame a paul médico si está teniendo problemas. También es filippo buena idea saber los resultados de los exámenes y mantener filippo lista de los medicamentos que kandi. Cómo puede cuidarse en el hogar? · Siéntese o acuéstese en las posiciones más cómodas y que reduzcan el dolor. Trate filippo de estas posiciones al [place]: ¨ Acuéstese boca arriba con las rodillas dobladas y apoyadas sobre [address]. ¨ Acuéstese en el piso con las piernas sobre el asiento de un sofá o filippo silla. ¨ Acuéstese de lado con las rodillas y caderas dobladas y [place] entre las piernas. ¨ Acuéstese boca abajo siempre que esta posición no empeore el dolor. · No se siente en la cama y evite los sofás blandos y las posiciones torcidas. El reposo en cama puede aliviar el dolor al principio, augustin retrasa la sanación. Evite reposar en la cama después del primer día de dolor de espalda. · Cambie de posición cada 30 minutos. Si debe sentarse por [employer], párese y descanse de estar sentado. Levántese y camine, o acuéstese en filippo posición cómoda. · Pruebe usar filippo almohadilla térmica a temperatura baja o mediana brady 15 a 20 minutos cada 2 ó 3 horas. Pruebe filippo ducha tibia en vez de filippo sesión con la almohadilla térmica. · También puede probar filippo compresa de hielo brady 10 a 15 minutos cada 2 a 3 horas. Póngase un paño valencia entre la compresa de hielo y la piel. · [employer] analgésicos (medicamentos para el dolor) exactamente según las indicaciones. ¨ Si el médico le recetó un analgésico, tómelo según las indicaciones. ¨ Si no está tomando un analgésico recetado, pregúntele a paul médico si puede harry shannan de [employer]. · Theresa caminatas cortas varias veces al día. Usted puede comenzar con 5 a 10 minutos, 3 ó 4 veces al día, y aumentar progresivamente hasta lograr caminatas más largas. Camine en superficies margarita y evite colinas y escaleras hasta que la espalda esté mejor. · Regrese al [place] y otras actividades lo más pronto posible. El descanso continuo sin actividad por lo general no es rodriguez para la espalda. · Para prevenir el dolor de espalda en el futuro, jolene ejercicios para estirar y fortalecer la espalda y el abdomen. Aprenda a Time Dukes, técnicas seguras para levantar peso y la mecánica corporal apropiada. Cuándo debe pedir ayuda? Llame a paul médico ahora mismo o busque atención médica inmediata si: 
? · Tiene un nuevo entumecimiento en [place]. ? · Tiene un nuevo debilitamiento en [place]. ([place] puede hacer que sea difícil ponerse de pie). ? · Pierde el control de la vejiga o los intestinos. ?Preste especial atención a los cambios en paul marla y asegúrese de comunicarse con paul médico si: 
? · El dolor KRISTIE. ? · No está mejorando después de 2 semanas. Dónde puede encontrar más información en inglés? Trinidad Segura a [email or web address] Luis Eduardo Early R226 en la búsqueda para aprender [facility] de \"Dolor de espalda: Instrucciones de cuidado - [ Back Pain: Care Instructions ]. \" 
Revisado: 21 marzo, 2017 Versión del contenido: 11.4 © 5265-2570 Healthwise, Incorporated. Las instrucciones de cuidado fueron adaptadas bajo licencia por Good Help Connections (which disclaims liability or warranty for this information). Si usted tiene [place] afección médica o sobre estas instrucciones, siempre pregunte a paul profesional de marla. Healthwise, Incorporated niega toda garantía o responsabilidad por paul uso de esta información. MyChart Announcement We are excited to announce that we are making your provider's discharge notes available to you in [employer]. You will see these notes when they are completed and signed by the physician that discharged you from your recent hospital stay.   If you have any questions or concerns about any information you see in MyChart, please call the Health Information Department where you were seen or reach out to your Primary Care Provider for more information about your plan of care. Introducing [facility]! [facility] introduce portal paciente MyChart . Ahora se puede acceder a partes de paul expediente médico, enviar por correo electrónico la oficina de paul médico y solicitar renovaciones de medicamentos en línea. En paul navegador de Internet , Kindra Ng a [email or web address] Jolene clic en el usuario por Chelo Sermon? Leandrew Pallas clic aquí en la sesión Guille Gibson. Verá la página de registro [place]. Ingrese paul código de [facility] Tatum james y lola aparece a continuación. Usted no tendrá que [employer] código después de joshua completado el proceso de registro . Si usted no se inscribe antes de la fecha de caducidad , debe solicitar un nuevo código. · MyChart Código de acceso : [id number] Expires: 8/13/2018 10:22 AM 
 
Ingresa los últimos cuatro dígitos de paul Número de Seguro Social ( xxxx ) y fecha de nacimiento ( dd / mm / aaaa ) lola se indica y jolene clic en Enviar. Usted será llevado a la siguiente página de registro . Crear un ID MyChart . Esta será paul ID de inicio de sesión de MyChart y no puede ser [place] , por lo que pensar en filippo que es Atul Bowen y fácil de recordar . Crear filippo contraseña MyChart . Usted puede cambiar paul contraseña en cualquier momento . Ingrese paul Password Reset de preguntas y [place] . Cozad se puede utilizar en un momento posterior si usted olvida paul contraseña. Introduzca paul dirección de correo electrónico . Carmen Eng recibirá filippo notificación por correo electrónico cuando la nueva información está disponible en MyChart . Nikole Rising clic en Registrarse. Dianah Simmonds vimal y descargar porciones de paul expediente médico. 
Jolene clic en el enlace de descarga del menú Resumen para descargar filippo copia portátil de paul información médica . Si tiene [employer] , por favor visite la sección de preguntas frecuentes del sitio web MyChart . Recuerde, MyChart NO es que se utilizará para las necesidades urgentes. Para emergencias médicas , llame al 911 . Ahora disponible en paul iPhone y Android ! Introducing Quinn Wolfe As a [facility] patient, I wanted to make you aware of our electronic visit tool called Quinn [email or web address]. [employer] allows you to connect within minutes with a medical provider 24 hours a day, seven days a week via a mobile device or tablet or logging into a secure website from your computer. You can access [employer] from anywhere in the [place]. A virtual visit might be right for you when you have a simple condition and feel like you just dont want to get out of bed, or cant get away from work for an appointment, when your regular [facility] provider is not available (evenings, weekends or holidays), or when youre out of town and need minor care. Electronic visits cost only $49 and if the [facility] [employer] provider determines a prescription is needed to treat your condition, one can be electronically transmitted to a nearby pharmacy*. Please take a moment to enroll today if you have not already done so. The enrollment process is free and takes just a few minutes. To enroll, please download the Xagenic colleen to your tablet or phone, or visit [email or web address] to enroll on your computer. And, as an [address] [place] [address] patient with a Nativo account, the results of your visits will be scanned into your electronic medical record and your primary care provider will be able to view the scanned results. We urge you to continue to see your regular [facility] provider for your ongoing medical care.   And while your primary care provider may not be the one available when you seek a Quinn Wolfe virtual visit, the peace of mind you get from getting a real diagnosis real time can be priceless. For more information on Quinn Wolfe, view our Frequently Asked Questions (FAQs) at [email or web address]. Sincerely, 
 
Kolby Green MD 
Chief Medical Officer 50Gilberto Rodriguez *:  certain medications cannot be prescribed via Quinn Wolfe Providers Seen During Your Hospitalization Provider Specialty Primary office phone Darleen Stroud MD Emergency Medicine [phone] Phylicia Montanez MD Internal Medicine [phone] Shaylee Glass MD Internal Medicine [phone] Your Primary Care Physician (PCP) Primary Care Physician Office Phone Office Fax Mauricio Christiano [phone] You are allergic to the following No active allergies Recent Documentation Height Weight BMI OB Status Smoking Status 1.524 m 91.5 kg 39.4 kg/m2 Menopause Never Smoker Emergency Contacts Name Discharge Info Relation Home Work Mobile Melodie Prabhakar CAREGIVER [3] Spouse [3] [phone] Patient Belongings The following personal items are in your possession at time of discharge: 
  Dental Appliances: None  Visual Aid: Glasses      Home Medications: None   Jewelry: None  Clothing: At bedside, Shirt, Pants    Other Valuables: Purse, With patient Please provide this summary of care documentation to your next provider. Signatures-by signing, you are acknowledging that this After Visit Summary has been reviewed with you and you have received a copy. Patient Signature:  ____________________________________________________________ Date:  ____________________________________________________________  
  
Elisabeth Ball Provider Signature:  ____________________________________________________________ Date:  ____________________________________________________________ [facility] [facility] [address] 
[phone] Patient: Donell Evans MRN: [id number] :1960 Sobre luciano hospitalización Le admitieron el:  May 15, 2018 Luciano tratamiento más reciente fue el:  [facility] 6S NEURO-SCI TELE Le dieron de dee dee el: May 16, 2018 Por qué le ingresaron Luciano diagnosis primaria es:  No está archivado/a Luciano diagnosis también incluye:  Head Ache Follow-up Information Follow up With Details Comments Contact Info Gabe Corona MD   [address] 1007 [facility] 
[phone] Terrance Kelly MD Schedule an appointment as soon as possible for a visit To discuss possible surgery [address] 
[phone] Discharge Orders Neolane A check crispin indicates which time of day the medication should be taken. My Medications SIGA tomando estos medicamentos Instructions Each Dose to Equal  
 Morning Noon Evening Bedtime  
 albuterol 90 mcg/actuation inhaler También conocido lola:  PROVENTIL HFA, VENTOLIN HFA, PROAIR HFA Your last dose was: Your next dose is: Take 1 Puff by inhalation every four (4) hours as needed for Wheezing. 1 Puff  
    
   
   
   
  
 benzonatate 200 mg capsule También conocido lola:  TESSALON Your last dose was: Your next dose is: Take 200 mg by mouth three (3) times daily as needed for Cough. 200 mg  
    
   
   
   
  
 CRESTOR 20 mg tablet Medicamento genérico:  rosuvastatin Your last dose was: Your next dose is: Take 20 mg by mouth nightly. 20 mg  
    
   
   
   
  
 cyclobenzaprine 5 mg tablet También conocido lola:  FLEXERIL Your last dose was: Your next dose is: Take 5 mg by mouth two (2) times daily as needed for Muscle Spasm(s). 5 mg FLONASE ALLERGY RELIEF 50 mcg/actuation nasal spray Medicamento genérico:  fluticasone Your last dose was: Your next dose is: 2 Sprays by Both Nostrils route daily as needed for Rhinitis. 2 Spray  
    
   
   
   
  
 nabumetone 500 mg tablet También conocido lola:  RELAFEN Your last dose was: Your next dose is: Take 500 mg by mouth two (2) times daily as needed for Pain. 500 mg  
    
   
   
   
  
 valsartan-hydroCHLOROthiazide 80-12.5 mg per tablet También conocido lola:  DIOVAN-HCT Your last dose was: Your next dose is: Take 1 Tab by mouth daily. 1 Tab XYZAL 5 mg tablet Medicamento genérico:  levocetirizine Your last dose was: Your next dose is: Take 5 mg by mouth nightly. 5 mg Instrucciones a lynsey de dee dee Discharge Instructions PATIENT ID: Eh Gutiérrez MRN: [id number] YOB: 1960 DATE OF ADMISSION: 5/15/2018 10:50 AM   
DATE OF DISCHARGE: 5/16/2018 PRIMARY CARE PROVIDER: Neena Braun MD  
 
ATTENDING PHYSICIAN: Roland Lucas MD 
DISCHARGING PROVIDER: Roland Lucas MD   
To contact this individual call [phone] and ask the  to page. If unavailable ask to be transferred the Adult Hospitalist Department. DISCHARGE DIAGNOSES Headache Hindbrain congenital abnormality with basilar invagination CONSULTATIONS: IP CONSULT TO NEUROSURGERY 
 
PROCEDURES/SURGERIES: * No surgery found * PENDING TEST RESULTS:  
At the time of discharge the following test results are still pending: none FOLLOW UP APPOINTMENTS:  
Follow-up Information Follow up With Details Comments Contact Info Neena Braun MD   [address] [place] [address] 
[phone] ADDITIONAL CARE RECOMMENDATIONS: Follow up with neurosurgery DIET: Regular Diet ACTIVITY: Activity as tolerated DISCHARGE MEDICATIONS: 
 See Medication Reconciliation Form · It is important that you take the medication exactly as they are prescribed. · Keep your medication in the bottles provided by the pharmacist and keep a list of the medication names, dosages, and times to be taken in your wallet. · Do not take other medications without consulting your doctor. NOTIFY YOUR PHYSICIAN FOR ANY OF THE FOLLOWING:  
Fever over 101 degrees for 24 hours. Chest pain, shortness of breath, fever, chills, nausea, vomiting, diarrhea, change in mentation, falling, weakness, bleeding. Severe pain or pain not relieved by medications. Or, any other signs or symptoms that you may have questions about. DISPOSITION: 
x  Home With: 
 OT  PT  [place]  RN  
  
 SNF/Inpatient Rehab/LTAC Independent/assisted living Hospice Other:  
 
 
 
 
Signed:  
Eleuterio Cheema MD 
5/16/2018 
8:41 AM 
  
Dolor de espalda: Instrucciones de cuidado - [ Back Pain: Care Instructions ] Instrucciones de cuidado El dolor de espalda tiene muchas causas posibles. Con frecuencia, está relacionado con problemas en los músculos y ligamentos de la espalda. También podría estar relacionado con problemas de los nervios, discos o huesos de la espalda. Moverse, levantar algo, ponerse de pie, sentarse o dormir de [employer] incómoda pueden forzar la espalda. Algunas veces no se da cuenta de que tiene filippo lesión [employer] tarde. La artritis es otra causa común del dolor de espalda. Aunque james vez duela mucho, el dolor de espalda por lo general mejora por sí mismo en varias semanas. [address] personas se recuperan en 12 semanas o menos. Hacer un buen tratamiento en el hogar y tener cuidado de no forzar la espalda puede ayudar a que se sienta mejor más pronto.  
Gloria Hard de seguimiento es filippo parte clave de paul tratamiento y seguridad. Asegúrese de hacer y acudir a todas las citas, y llame a paul médico si está teniendo problemas. También es filippo buena idea saber los resultados de los exámenes y mantener filippo lista de los medicamentos que kandi. Cómo puede cuidarse en el hogar? · Siéntese o acuéstese en las posiciones más cómodas y que reduzcan el dolor. Trate filippo de estas posiciones al [place]: ¨ Acuéstese boca arriba con las rodillas dobladas y apoyadas sobre [address] [place]. ¨ Acuéstese en el piso con las piernas sobre el asiento de un sofá o filippo silla. ¨ Acuéstese de lado con las rodillas y caderas dobladas y [place] entre las piernas. ¨ Acuéstese boca abajo siempre que esta posición no empeore el dolor. · No se siente en la cama y evite los sofás blandos y las posiciones torcidas. El reposo en cama puede aliviar el dolor al principio, augustin retrasa la sanación. Evite reposar en la cama después del primer día de dolor de espalda. · Cambie de posición cada 30 minutos. Si debe sentarse por [employer], párese y descanse de estar sentado. Levántese y camine, o acuéstese en filippo posición cómoda. · Pruebe usar filippo almohadilla térmica a temperatura baja o mediana brady 15 a 20 minutos cada 2 ó 3 horas. Pruebe filippo ducha tibia en vez de filippo sesión con la almohadilla térmica. · También puede probar filippo compresa de hielo brady 10 a 15 minutos cada 2 a 3 horas. Póngase un paño valencia entre la compresa de hielo y la piel. · [employer] analgésicos (medicamentos para el dolor) exactamente según las indicaciones. ¨ Si el médico le recetó un analgésico, tómelo según las indicaciones. ¨ Si no está tomando un analgésico recetado, pregúntele a paul médico si puede harry shannan de [place]. · Theresa caminatas cortas varias veces al día. Usted puede comenzar con 5 a 10 minutos, 3 ó 4 veces al día, y aumentar progresivamente hasta lograr caminatas más largas.  Camine en superficies margarita y evite colinas y escaleras hasta que la espalda esté mejor. · Regrese al [place] y otras actividades lo más pronto posible. El descanso continuo sin actividad por lo general no es rodriguez para la espalda. · Para prevenir el dolor de espalda en el futuro, jolene ejercicios para estirar y fortalecer la espalda y el abdomen. Aprenda a Time Dukes, técnicas seguras para levantar peso y la mecánica corporal apropiada. Cuándo debe pedir ayuda? Llame a paul médico ahora mismo o busque atención médica inmediata si: 
? · Tiene un nuevo entumecimiento en [place]. ? · Tiene un nuevo debilitamiento en [place]. ([place] puede hacer que sea difícil ponerse de pie). ? · Pierde el control de la vejiga o los intestinos. ?Preste especial atención a los cambios en paul marla y asegúrese de comunicarse con paul médico si: 
? · El [facility]or [facility]. ? · No está mejorando después de 2 semanas. Dónde puede encontrar más información en inglés? Noreen Mccracken a [email or web address] Umu Martinez S127 en la búsqueda para aprender [place] de \"Dolor de espalda: Instrucciones de cuidado - [ Back Pain: Care Instructions ]. \" 
Revisado: 21 marzo, 2017 Versión del contenido: 11.4 © 7321-7381 Healthwise, Incorporated. Las instrucciones de cuidado fueron adaptadas bajo licencia por Good Help Connections (which disclaims liability or warranty for this information). Si usted tiene [place] afección médica o sobre estas instrucciones, siempre pregunte a paul profesional de marla. Healthwise, Incorporated niega toda garantía o responsabilidad por palu uso de esta información. MyChart Announcement We are excited to announce that we are making your provider's discharge notes available to you in [employer]. You will see these notes when they are completed and signed by the physician that discharged you from your recent hospital stay.   If you have any questions or concerns about any information you see in MyChart, please call the Health Information Department where you were seen or reach out to your Primary Care Provider for more information about your plan of care. Introducing [facility]! [facility] introduce portal paciente MyChart . Ahora se puede acceder a partes de paul expediente médico, enviar por correo electrónico la oficina de paul médico y solicitar renovaciones de medicamentos en línea. En paul navegador de Internet , Kindra Ng a [email or web address] Jolene clic en el usuario por Chelo Sermon? Leandrew Pallas clic aquí en la sesión Guille Gibson. Verá la página de registro [place]. Ingrese paul código de [facility] james y lola aparece a continuación. Usted no tendrá que [employer] código después de joshua completado el proceso de registro . Si usted no se inscribe antes de la fecha de caducidad , debe solicitar un nuevo código. · MyChart Código de acceso : [id number] Expires: 8/13/2018 10:22 AM 
 
Ingresa los últimos cuatro dígitos de paul Número de Seguro Social ( xxxx ) y fecha de nacimiento ( dd / mm / aaaa ) lola se indica y jolene clic en Enviar. Usted será llevado a la siguiente página de registro . Crear un ID MyChart . Esta será paul ID de inicio de sesión de MyChart y no puede ser [place] , por lo que pensar en filippo que es Atul Bowen y fácil de recordar . Crear filippo contraseña MyChart . Usted puede cambiar paul contraseña en cualquier momento . Ingrese paul Password Reset de preguntas y [place] . [place] se puede utilizar en un momento posterior si usted olvida paul contraseña. Introduzca paul dirección de correo electrónico . Carmen Eng recibirá filippo notificación por correo electrónico cuando la nueva información está disponible en MyChart . Nikole Rising clic en Registrarse. Dianah Simmonds vimal y descargar porciones de paul expediente médico. 
Jolene clic en el enlace de descarga del menú Resumen para descargar filippo copia portátil de paul información médica . Si tiene [employer] , por favor visite la sección de preguntas frecuentes del sitio web MyChart . Recuerde, MyChart NO es que se utilizará para las necesidades urgentes. Para emergencias médicas , llame al 911 . Ahora disponible en pual iPhone y Android ! Introducing Quinn Wolfe As a [facility] patient, I wanted to make you aware of our electronic visit tool called Quinn [employer]. [facility] allows you to connect within minutes with a medical provider 24 hours a day, seven days a week via a mobile device or tablet or logging into a secure website from your computer. You can access [employer] from anywhere in the [place]. A virtual visit might be right for you when you have a simple condition and feel like you just dont want to get out of bed, or cant get away from work for an appointment, when your regular [facility] provider is not available (evenings, weekends or holidays), or when youre out of town and need minor care. Electronic visits cost only $49 and if the [facility] [email or web address] provider determines a prescription is needed to treat your condition, one can be electronically transmitted to a nearby pharmacy*. Please take a moment to enroll today if you have not already done so. The enrollment process is free and takes just a few minutes. To enroll, please download the Smart [place] colleen to your tablet or phone, or visit [email or web address] to enroll on your computer. And, as an [address] [place] [address] patient with a E-Box [employer] [email or web address].it account, the results of your visits will be scanned into your electronic medical record and your primary care provider will be able to view the scanned results. We urge you to continue to see your regular [facility] provider for your ongoing medical care.   And while your primary care provider may not be the one available when you seek a Quinn Wolfe virtual visit, the peace of mind you get from getting a real diagnosis real time can be priceless. For more information on Quinn Wolfe, view our Frequently Asked Questions (FAQs) at [email or web address]. Sincerely, 
 
Amara Pittman MD 
Chief Medical Officer Carito Rodriguez *:  certain medications cannot be prescribed via Quinn Wolfe Providers Seen During Your Hospitalization Personal Médico [facility]special[facility] Teléfono principal de la josé luisa Mila Cowan MD Emergency Medicine [phone] Richrd Olszewski, MD Internal Medicine [phone] Violette Horan MD Internal Medicine [phone] Luciano médico de atención primaria (PCP ) Primary Care Physician Office Phone Office Fax Edel Felicitas [phone] Tiene alergias a lo siguiente No tiene alergias Documentación recientes Height Weight BMI (IMC) Estado obstétrico Estatus de tabaquísmo 1.524 m 91.5 kg 39.4 kg/m2 Menopause Never Smoker Emergency Contacts Name Discharge Info Relation Home Work Mobile Melanee Slipper CAREGIVER [3] Spouse [3] [phone] Patient Belongings The following personal items are in your possession at time of discharge: 
  Dental Appliances: None  Visual Aid: Glasses      Home Medications: None   Jewelry: None  Clothing: At bedside, Shirt, Pants    Other Valuables: Purse, With patient Please provide this summary of care documentation to your next provider. Signatures-by signing, you are acknowledging that this After Visit Summary has been reviewed with you and you have received a copy. Patient Signature:  ____________________________________________________________ Date:  ____________________________________________________________  
  
Fayrene Retort Provider Signature:  ____________________________________________________________ Date:  ____________________________________________________________

## 2018-05-15 NOTE — ED NOTES
Bedside and Verbal shift change report received from John Minaya, [address] [place] [address] (offgoing nurse). Report included the following information SBAR, ED Summary, MAR and Recent Results.      Pt resting on stretcher at this time and pharmacy at the bedside to update medications

## 2018-05-15 NOTE — PROGRESS NOTES
Admission Medication Reconciliation:    Information obtained from: Patient via [employer] , RX bottles, RX query    Significant PMH/Disease States:   Past Medical History:   Diagnosis Date    Asthma     High cholesterol     HTN (hypertension)     Hypertension        Chief Complaint for this Admission:  Headache/neck pain    Allergies:  Review of patient's allergies indicates no known allergies. Prior to Admission Medications:   Prior to Admission Medications   Prescriptions Last Dose Informant Patient Reported? Taking? albuterol (PROVENTIL HFA, VENTOLIN HFA, PROAIR HFA) 90 mcg/actuation inhaler   Yes Yes   Sig: Take 1 Puff by inhalation every four (4) hours as needed for Wheezing. benzonatate (TESSALON) 200 mg capsule   Yes Yes   Sig: Take 200 mg by mouth three (3) times daily as needed for Cough. cyclobenzaprine (FLEXERIL) 5 mg tablet   Yes Yes   Sig: Take 5 mg by mouth two (2) times daily as needed for Muscle Spasm(s). fluticasone (FLONASE ALLERGY RELIEF) 50 mcg/actuation nasal spray   Yes Yes   Si Sprays by Both Nostrils route daily as needed for Rhinitis. levocetirizine (XYZAL) 5 mg tablet   Yes Yes   Sig: Take 5 mg by mouth nightly. nabumetone (RELAFEN) 500 mg tablet   Yes Yes   Sig: Take 500 mg by mouth two (2) times daily as needed for Pain. rosuvastatin (CRESTOR) 20 mg tablet 2018 at Unknown time  Yes Yes   Sig: Take 20 mg by mouth nightly. valsartan-hydroCHLOROthiazide (DIOVAN-HCT) 80-12.5 mg per tablet 5/15/2018 at Unknown time  Yes Yes   Sig: Take 1 Tab by mouth daily. Facility-Administered Medications: None         Comments/Recommendations: Added all of the above medications, removed simvastatin.

## 2018-05-15 NOTE — ED NOTES
Pt reports feeling much better. Notified ER provider. Updated pt and family regarding awaiting neurosurgery.  They verbalized understanding

## 2018-05-15 NOTE — PROGRESS NOTES
Spoke with PA about consulting for this pt  She needs an MRI of the C spine and cervico-medullary junction    Will see pt later today

## 2018-05-16 VITALS
TEMPERATURE: 98.4 F | SYSTOLIC BLOOD PRESSURE: 121 MMHG | HEART RATE: 75 BPM | WEIGHT: 201.72 LBS | BODY MASS INDEX: 39.6 KG/M2 | HEIGHT: 60 IN | DIASTOLIC BLOOD PRESSURE: 56 MMHG | RESPIRATION RATE: 18 BRPM | OXYGEN SATURATION: 94 %

## 2018-05-16 PROCEDURE — 99218 HC RM OBSERVATION: CPT

## 2018-05-16 PROCEDURE — 74011250637 HC RX REV CODE- 250/637: Performed by: INTERNAL MEDICINE

## 2018-05-16 PROCEDURE — 96376 TX/PRO/DX INJ SAME DRUG ADON: CPT

## 2018-05-16 PROCEDURE — 96375 TX/PRO/DX INJ NEW DRUG ADDON: CPT

## 2018-05-16 PROCEDURE — 74011250636 HC RX REV CODE- 250/636: Performed by: INTERNAL MEDICINE

## 2018-05-16 RX ADMIN — Medication 10 ML: at 06:24

## 2018-05-16 RX ADMIN — KETOROLAC TROMETHAMINE 15 MG: 30 INJECTION, SOLUTION INTRAMUSCULAR; INTRAVENOUS at 06:22

## 2018-05-16 RX ADMIN — SIMVASTATIN 20 MG: 20 TABLET, FILM COATED ORAL at 01:47

## 2018-05-16 RX ADMIN — KETOROLAC TROMETHAMINE 15 MG: 30 INJECTION, SOLUTION INTRAMUSCULAR; INTRAVENOUS at 00:22

## 2018-05-16 RX ADMIN — ACETAMINOPHEN 650 MG: 325 TABLET ORAL at 08:35

## 2018-05-16 RX ADMIN — Medication 10 ML: at 00:33

## 2018-05-16 NOTE — CONSULTS
[address]    Em Divers  MR#: [id number]  : 1960  ACCOUNT #: [de-identified]   DATE OF SERVICE: 05/15/2018    REQUESTING PHYSICIAN:  Dr. Fajardo Expose:  A 26-year-old woman with upper cervical cord compression, complained of headache, neck pain for about a week. HISTORY OF PRESENT ILLNESS:  Patient also complains of some occasional arm pain and hand pain. Symptoms have been off and on for many years, but worse in the last week and when it did not get any better, she came to the emergency room. There is no history of seizures. No loss of consciousness. No weakness of the lower extremities. No history of trauma. An MRI was performed and the results are as noted below. PAST MEDICAL HISTORY:  Asthma, hypercholesterolemia, hypertension. PAST SURGICAL HISTORY:  . FAMILY HISTORY:  Unremarkable. SOCIAL HISTORY:  She does not smoke or use alcohol. She is from [place]. REVIEW OF SYSTEMS:  She denies any trouble swallowing, breathing, shortness of breath. No other GI, , respiratory, cardiac, endocrine, hematologic, neurologic, psychiatric complaints. PHYSICAL EXAMINATION:  VITAL SIGNS:  Blood pressure is 116/59, temperature 97.5, pulse rate 83, respiratory rate 16. NEUROLOGIC:  She moves all 4 extremities equally. She has good strength and tone in all muscle groups. Sensation appears intact to light touch. Pupils equal and reactive. Extraocular muscles are intact. Gait and station are deferred. Palate rises equally. Tongue protrudes midline. Shoulder shrug is normal.  Corneal response equal and present bilaterally. Toes are downgoing. There is no clonus at the ankles. No Smart sign. IMAGING STUDIES:  Includes MRI of the brain as well as a CT of the brain. The CT of the brain shows that the patient has a very short neck.   This is confirmed also on the physical exam.  There appears to be incorporation of the C1 arch with the suboccipital bone. 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 The cerebellar tonsils come down to the area of the foramen magnum, but not necessarily below it, but there is clearly cervical medullary compression as a result of the basilar invagination. There is clearly a hindbrain abnormality and is most likely the source of her symptoms. It is likely congenital in nature. There is no immediate urgency to any surgical intervention. She feels well. She could probably be discharged and followed up in the office. According to the family tonight she is not looking for any urgent surgical intervention. I do not think the patient is in any dire straits that she needs surgical decompression immediately. She may very well indeed though come to surgery. If she does go home, again she could follow up with me in my office. Thank you for asking me to see the patient.       MD ILEANA Gomes / [place]  D: 05/15/2018 21:10     T: 05/15/2018 21:40  JOB #: [id number]  CC: Prince Stone MD

## 2018-05-16 NOTE — PROGRESS NOTES
Reason for Admission:  Patient presented to ED from PCP office with headache- found to have abnormality, will follow-up with neurosurgery outpatient. RRAT Score:  3                   Plan for utilizing home health:  None- patient does not have PT/OT consultations, patient independent and no skilled nursing needs identified at this time. Likelihood of Readmission:  Low                         Transition of Care Plan: Home with neurosurgery follow-up outpatient    The CM met with patient at bedside in order to introduce role and assess for patient needs- patient understands English and able to participate in assessment. Patient lives at home with her - endorses  is on the way to the hospital to provide transportation. No needs or concerns identified at this time, patient sees Dr. Aimee Schmidt as PCP, saw prior to hospitalization. CM will continue to follow as discharge needs arise. Teddy Case, MSW    Care Management Interventions  PCP Verified by CM: Yes (Patient verified PCP as Dr. Aimee Schmidt)  Mode of Transport at Discharge:  Other (see comment) (Patient's  will provide transportation)  Transition of Care Consult (CM Consult): Discharge Planning  MyChart Signup: No  Discharge Durable Medical Equipment: No  Health Maintenance Reviewed: Yes  Physical Therapy Consult: No  Occupational Therapy Consult: No  Speech Therapy Consult: No  Current Support Network: Own Home, Lives with Spouse  Confirm Follow Up Transport: Family  Plan discussed with Pt/Family/Caregiver: Yes   Resource Information Provided?: No  Discharge Location  Discharge Placement: Home

## 2018-05-16 NOTE — INTERDISCIPLINARY ROUNDS
IDR/SLIDR Summary          Patient: Louis Norman MRN: [id number]    Age: 62 y.o. YOB: 1960 Room/Bed: [facility]   Admit Diagnosis: Head ache  Principal Diagnosis: <principal problem not specified>   Goals: pain control, free from falls  Readmission: NO  Quality Measure: Not applicable  VTE Prophylaxis: Mechanical  Influenza Vaccine screening completed? YES  Pneumococcal Vaccine screening completed? NO  Mobility needs: No   Nutrition plan:No  Consults:{Consult needed:53278}    Financial concerns:No  Escalated to CM? NO  RRAT Score: 3   Interventions:{Intervention:65104}  Testing due for pt today?  YES  LOS: 0 days Expected length of stay *** days  Discharge plan: TBD   PCP: Leyla Maier MD  Transportation needs: No    Days before discharge:discharge pending  Discharge disposition: ***    Signed:     Heydi Faith RN  5/16/2018  4:35 AM

## 2018-05-16 NOTE — PROGRESS NOTES
Problem: Pressure Injury - Risk of  Goal: *Prevention of pressure injury  Document Alexander Scale and appropriate interventions in the flowsheet. Outcome: Progressing Towards Goal  Pressure Injury Interventions:   patient to turn self in bed throughout the day                                         Problem: Falls - Risk of  Goal: *Absence of Falls  Document Lonny Fall Risk and appropriate interventions in the flowsheet.   Fall Risk Interventions:  Mobility Interventions: Strengthening exercises (ROM-active/passive)

## 2018-05-16 NOTE — ED NOTES
Assumed care of pt. Report received from [facility]. Pt ambulated to restroom with steady gait, family remains at bedside    2055: Neurosurgery at bedside  2115: Wali Christa gave okay for pt to be discharged home and follow up in office. Hospitalist paged for discharge instructions  2130: Spoke with hospitalist who stated pt needed to stay for observation because Attending needed to discharge patient. 2230: Pt's family home for the night, stated to call should admission not occur. 0010: Patient left department with RN on cardiac monitor for transportation to inpatient bed. Patient's VS at the time of transfer were /59, 95 on RA, denies pain at this time, 98.7 orally, HR 99 sinus rhythm on the monitor. Patient was alert and oriented x 4 and denies further needs at time of transfer. Patient's family went home prior to transfer to floor. TRANSFER - OUT REPORT:    Verbal report given to AMBER Fuentes(name) on [facility]  being transferred to [facility](unit) for routine progression of care       Report consisted of patients Situation, Background, Assessment and   Recommendations(SBAR). Information from the following report(s) SBAR, Kardex, ED Summary, [facility] and Recent Results was reviewed with the receiving nurse. Opportunity for questions and clarification was provided.

## 2018-05-16 NOTE — DISCHARGE INSTRUCTIONS
Discharge Instructions       PATIENT ID: Eh Gutiérrez  MRN: [id number]   YOB: 1960    DATE OF ADMISSION: 5/15/2018 10:50 AM    DATE OF DISCHARGE: 5/16/2018    PRIMARY CARE PROVIDER: Neena Braun MD     ATTENDING PHYSICIAN: Roland Lucas MD  DISCHARGING PROVIDER: Roland Lucas MD    To contact this individual call [phone] and ask the  to page. If unavailable ask to be transferred the Adult Hospitalist Department. DISCHARGE DIAGNOSES   Headache  Hindbrain congenital abnormality with basilar invagination    CONSULTATIONS: IP CONSULT TO NEUROSURGERY    PROCEDURES/SURGERIES: * No surgery found *    PENDING TEST RESULTS:   At the time of discharge the following test results are still pending: none    FOLLOW UP APPOINTMENTS:   Follow-up Information     Follow up With Details Comments Dirk Duque MD   [place] [address]  [phone]             ADDITIONAL CARE RECOMMENDATIONS: Follow up with neurosurgery    DIET: Regular Diet    ACTIVITY: Activity as tolerated        DISCHARGE MEDICATIONS:   See Medication Reconciliation Form    · It is important that you take the medication exactly as they are prescribed. · Keep your medication in the bottles provided by the pharmacist and keep a list of the medication names, dosages, and times to be taken in your wallet. · Do not take other medications without consulting your doctor. NOTIFY YOUR PHYSICIAN FOR ANY OF THE FOLLOWING:   Fever over 101 degrees for 24 hours. Chest pain, shortness of breath, fever, chills, nausea, vomiting, diarrhea, change in mentation, falling, weakness, bleeding. Severe pain or pain not relieved by medications. Or, any other signs or symptoms that you may have questions about.       DISPOSITION:  x  Home With:   OT  PT  HH  RN       SNF/Inpatient Rehab/LTAC    Independent/assisted living    Hospice    Other: Signed:   Fadi Fernandez MD  5/16/2018  8:41 AM     Dolor de espalda: Instrucciones de cuidado - [ Back Pain: Care Instructions ]  Instrucciones de cuidado    El dolor de espalda tiene muchas causas posibles. Con frecuencia, está relacionado con problemas en los músculos y ligamentos de la espalda. También podría estar relacionado con problemas de los nervios, discos o huesos de la espalda. Moverse, levantar algo, ponerse de pie, sentarse o dormir de Alessia incómoda pueden forzar la espalda. Algunas veces no se da cuenta de que tiene filippo lesión [employer] tarde. La artritis es otra causa común del dolor de espalda. Aunque james vez duela mucho, el dolor de espalda por lo general mejora por sí mismo en varias semanas. [address] personas se recuperan en 12 semanas o menos. Hacer un buen tratamiento en el hogar y tener cuidado de no forzar la espalda puede ayudar a que se sienta mejor más pronto. La atención de seguimiento es filippo parte clave de paul tratamiento y seguridad. Asegúrese de hacer y acudir a todas las citas, y llame a paul médico si está teniendo problemas. También es filippo buena idea saber los resultados de los exámenes y mantener filippo lista de los medicamentos que kandi. ¿Cómo puede cuidarse en el hogar? · Siéntese o acuéstese en las posiciones más cómodas y que reduzcan el dolor. Trate filippo de estas posiciones al acostarse:  ¨ Acuéstese boca arriba con las rodillas dobladas y apoyadas sobre almohadones grandes. ¨ Acuéstese en el piso con las piernas sobre el asiento de un sofá o filippo silla. ¨ Acuéstese de lado con las rodillas y caderas dobladas y [place] entre las piernas. ¨ Acuéstese boca abajo siempre que esta posición no empeore el dolor. · No se siente en la cama y evite los sofás blandos y las posiciones torcidas. El reposo en cama puede aliviar el dolor al principio, augustin retrasa la sanación. Evite reposar en la cama después del primer día de dolor de espalda.   · Cambie de posición cada 30 minutos. Si debe sentarse por [employer], párese y descanse de estar sentado. Levántese y camine, o acuéstese en filippo posición cómoda. · Pruebe usar filippo almohadilla térmica a temperatura baja o mediana brady 15 a 20 minutos cada 2 ó 3 horas. Pruebe filippo ducha tibia en vez de filippo sesión con la almohadilla térmica. · También puede probar filippo compresa de hielo brady 10 a 15 minutos cada 2 a 3 horas. Póngase un paño valencia entre la compresa de hielo y la piel. · [employer] analgésicos (medicamentos para el dolor) exactamente según las indicaciones. ¨ Si el médico le recetó un analgésico, tómelo según las indicaciones. ¨ Si no está tomando un analgésico recetado, pregúntele a paul médico si puede harry shannan de [address] · Jolene caminatas cortas varias veces al día. Usted puede comenzar con 5 a 10 minutos, 3 ó 4 veces al día, y aumentar progresivamente hasta lograr caminatas más largas. Camine en superficies margarita y evite colinas y escaleras hasta que la espalda esté mejor. · Regrese al [place] y otras actividades lo más pronto posible. El descanso continuo sin actividad por lo general no es rodriguez para la espalda. · Para prevenir el dolor de espalda en el futuro, jolene ejercicios para estirar y fortalecer la espalda y el abdomen. Aprenda a [place], técnicas seguras para levantar peso y la mecánica corporal apropiada. ¿Cuándo debe pedir ayuda? Llame a paul médico ahora mismo o busque atención médica inmediata si:  ? · Tiene un nuevo entumecimiento en [place]. ? · Tiene un nuevo debilitamiento en [place]. ([place] puede hacer que sea difícil ponerse de pie). ? · Pierde el control de la vejiga o los intestinos. ?Preste especial atención a los cambios en paul marla y asegúrese de comunicarse con paul médico si:  ? · El jadon FLOWERS. ? · No está mejorando después de 2 semanas. ¿Dónde puede encontrar más información en inglés?   Shweta Montgomery a [email or web address] Eduardo Romeroache Z240 en la búsqueda para aprender Alloziel Finkd de \"Dolor de espalda: Instrucciones de cuidado - [ Back Pain: Care Instructions ]. \"  Revisado: 21 marzo, 2017  Versión del contenido: 11.4  © 4346-8022 Healthwise, [employer]. Las instrucciones de cuidado fueron adaptadas bajo licencia por [employer] (which disclaims liability or warranty for this information). Si usted tiene [place] afección médica o sobre estas instrucciones, siempre pregunte a paul profesional de marla. [employer] niega toda garantía o responsabilidad por paul uso de esta información.

## 2018-05-16 NOTE — PROGRESS NOTES
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  Not an emergency, she could go home, she has no acute focal deficits, and follow up in office and subsequent surgery if she agrees  Full note dictated

## 2018-05-16 NOTE — ROUTINE PROCESS
Bedside shift change report given to Chan Eugene (oncoming nurse) by Alfredo (offgoing nurse). Report included the following information SBAR, Kardex, Procedure Summary, Accordion, Recent Results and Cardiac Rhythm NSR.

## 2018-05-16 NOTE — ED NOTES
Bedside and Verbal shift change report given to Nando Gomes RN (oncoming nurse) by Anson Liriano RN (offgoing nurse). Report included the following information SBAR, ED Summary, MAR and Recent Results.

## 2018-05-16 NOTE — DISCHARGE SUMMARY
Discharge Summary       PATIENT ID: Joy Lopez  MRN: [id number]   YOB: 1960    DATE OF ADMISSION: 5/15/2018 10:50 AM    DATE OF DISCHARGE: 5/16/2018  PRIMARY CARE PROVIDER: Syed Jamison MD     ATTENDING PHYSICIAN: Isa Littlejohn  DISCHARGING PROVIDER: Violette Horan MD    To contact this individual call [phone] and ask the  to page. If unavailable ask to be transferred the Adult Hospitalist Department. CONSULTATIONS: IP CONSULT TO NEUROSURGERY    PROCEDURES/SURGERIES: * No surgery found *    ADMITTING DIAGNOSES & HOSPITAL COURSE:   A 59-year-old female who is presenting to the ER because she is complaining of headache all over the head with the neck pain as well. This is going on for almost a week. It is not associated with any fever, photophobia, nausea, vomiting, diarrhea. No cough. No chest pain. She has also got prescription for multiple medications now. Patient also complains of some occasional arm pain and hand pain. Symptoms have been off and on for many years, but worse in the last week and when it did not get any better, she came to the emergency room. There is no history of seizures. No loss of consciousness. No weakness of the lower extremities. No history of trauma. 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 Patient admitted for observation and Neurosurgery consulted          [address] / PLAN:      #. Upper cervical cord compression  Neurosurgery evaluation appreciated; \"There is no immediate urgency to any surgical intervention. She feels well. She could probably be discharged and followed up in the office.   According to the family tonight she is not looking for any urgent surgical intervention. I do not think the patient is in any dire straits that she needs surgical decompression immediately. She may very well indeed though come to surgery. If she does go home, again she could follow up with me in my office. \"    #. Hypertension. Continue home medication. #. Asthma: stable         PENDING TEST RESULTS:   At the time of discharge the following test results are still pending: none    FOLLOW UP APPOINTMENTS:    Follow-up Information     Follow up With Details Comments Dirk Duque MD   [address]              ADDITIONAL CARE RECOMMENDATIONS: Follow up with neurosurgery    DIET: Regular Diet    ACTIVITY: Activity as tolerated      DISCHARGE MEDICATIONS:  Current Discharge Medication List      CONTINUE these medications which have NOT CHANGED    Details   benzonatate (TESSALON) 200 mg capsule Take 200 mg by mouth three (3) times daily as needed for Cough. cyclobenzaprine (FLEXERIL) 5 mg tablet Take 5 mg by mouth two (2) times daily as needed for Muscle Spasm(s). levocetirizine (XYZAL) 5 mg tablet Take 5 mg by mouth nightly. nabumetone (RELAFEN) 500 mg tablet Take 500 mg by mouth two (2) times daily as needed for Pain.      valsartan-hydroCHLOROthiazide (DIOVAN-HCT) 80-12.5 mg per tablet Take 1 Tab by mouth daily. rosuvastatin (CRESTOR) 20 mg tablet Take 20 mg by mouth nightly. albuterol (PROVENTIL HFA, VENTOLIN HFA, PROAIR HFA) 90 mcg/actuation inhaler Take 1 Puff by inhalation every four (4) hours as needed for Wheezing. fluticasone (FLONASE ALLERGY RELIEF) 50 mcg/actuation nasal spray 2 Sprays by Both Nostrils route daily as needed for Rhinitis. NOTIFY YOUR PHYSICIAN FOR ANY OF THE FOLLOWING:   Fever over 101 degrees for 24 hours.    Chest pain, shortness of breath, fever, chills, nausea, vomiting, diarrhea, change in mentation, falling, weakness, bleeding. Severe pain or pain not relieved by medications. Or, any other signs or symptoms that you may have questions about. DISPOSITION:  x  Home With:   OT  PT  HH  RN       Long term SNF/Inpatient Rehab    Independent/assisted living    Hospice    Other:       PATIENT CONDITION AT DISCHARGE:     Functional status    Poor     Deconditioned    x Independent      Cognition    x Lucid     Forgetful     Dementia      Catheters/lines (plus indication)    Gutierrez     PICC     PEG    x None      Code status    x Full code     DNR      PHYSICAL EXAMINATION AT DISCHARGE:  Neuro Exam   She moves all 4 extremities equally. She has good strength and tone in all muscle groups. Sensation appears intact to light touch. Pupils equal and reactive. Extraocular muscles are intact. Gait and station are deferred. Palate rises equally. Tongue protrudes midline. Shoulder shrug is normal. Corneal response equal and present bilaterally. Toes are downgoing. There is no clonus at the ankles. No Smart sign.         CHRONIC MEDICAL DIAGNOSES:  Problem List as of 5/16/2018  Never Reviewed          Codes Class Noted - Resolved    Head ache ICD-10-CM: R51  ICD-9-CM: 784.0  5/15/2018 - Present              Greater than 25 minutes were spent with the patient on counseling and coordination of care    Signed:   Yaakov Bang MD  5/16/2018  9:07 AM

## 2018-05-16 NOTE — ROUTINE PROCESS
Primary Nurse Asiya Rivera RN and Thalia Flanagan RN performed a dual skin assessment on this patient Impairment noted- see wound doc flow sheet (rash to chest)  Alexander score is 21

## 2018-05-16 NOTE — ROUTINE PROCESS
TRANSFER - IN REPORT:    Verbal report received from [facility] JEFF (name) on Venkat Tay  being received from ED (unit) for routine progression of care      Report consisted of patients Situation, Background, Assessment and   Recommendations(SBAR). Information from the following report(s) SBAR, Kardex, ED Summary, Procedure Summary, Accordion, Recent Results and Cardiac Rhythm NSR was reviewed with the receiving nurse. Opportunity for questions and clarification was provided. Assessment completed upon patients arrival to unit and care assumed.

## 2018-05-16 NOTE — PROGRESS NOTES
Patient was sent to the ED because of persistent headache by primary care doctor, MRI of brain showed abnormality, seen by neurosurgeon, patient is still having headache, will add Toradol and fentanyl for control

## 2020-12-15 ENCOUNTER — TRANSCRIBE ORDER (OUTPATIENT)
Dept: SCHEDULING | Age: 60
End: 2020-12-15

## 2020-12-15 DIAGNOSIS — M65.28 CALCIFIC ACHILLES TENDONITIS: Primary | ICD-10-CM

## 2022-03-19 PROBLEM — R51.9 HEAD ACHE: Status: ACTIVE | Noted: 2018-05-15

## 2024-03-22 ENCOUNTER — OFFICE VISIT (OUTPATIENT)
Age: 64
End: 2024-03-22
Payer: COMMERCIAL

## 2024-03-22 VITALS
OXYGEN SATURATION: 98 % | HEIGHT: 56 IN | TEMPERATURE: 98.1 F | WEIGHT: 198.6 LBS | SYSTOLIC BLOOD PRESSURE: 142 MMHG | BODY MASS INDEX: 44.67 KG/M2 | HEART RATE: 67 BPM | RESPIRATION RATE: 16 BRPM | DIASTOLIC BLOOD PRESSURE: 80 MMHG

## 2024-03-22 DIAGNOSIS — Z76.89 ENCOUNTER TO ESTABLISH CARE: Primary | ICD-10-CM

## 2024-03-22 DIAGNOSIS — E78.5 DYSLIPIDEMIA: ICD-10-CM

## 2024-03-22 DIAGNOSIS — Z11.59 NEED FOR HEPATITIS C SCREENING TEST: ICD-10-CM

## 2024-03-22 DIAGNOSIS — I10 PRIMARY HYPERTENSION: ICD-10-CM

## 2024-03-22 DIAGNOSIS — Z12.31 SCREENING MAMMOGRAM FOR BREAST CANCER: ICD-10-CM

## 2024-03-22 DIAGNOSIS — Z91.09 ENVIRONMENTAL ALLERGIES: ICD-10-CM

## 2024-03-22 DIAGNOSIS — E66.01 CLASS 3 SEVERE OBESITY DUE TO EXCESS CALORIES WITH SERIOUS COMORBIDITY AND BODY MASS INDEX (BMI) OF 40.0 TO 44.9 IN ADULT (HCC): ICD-10-CM

## 2024-03-22 DIAGNOSIS — Z23 NEED FOR SHINGLES VACCINE: ICD-10-CM

## 2024-03-22 DIAGNOSIS — E11.9 TYPE 2 DIABETES MELLITUS WITHOUT COMPLICATION, WITHOUT LONG-TERM CURRENT USE OF INSULIN (HCC): ICD-10-CM

## 2024-03-22 LAB
ALBUMIN SERPL-MCNC: 4.2 G/DL (ref 3.5–5)
ALBUMIN/GLOB SERPL: 1 (ref 1.1–2.2)
ALP SERPL-CCNC: 94 U/L (ref 45–117)
ALT SERPL-CCNC: 6 U/L (ref 12–78)
ANION GAP SERPL CALC-SCNC: 6 MMOL/L (ref 5–15)
AST SERPL-CCNC: 67 U/L (ref 15–37)
BASOPHILS # BLD: 0.1 K/UL (ref 0–0.1)
BASOPHILS NFR BLD: 1 % (ref 0–1)
BILIRUB SERPL-MCNC: 0.4 MG/DL (ref 0.2–1)
BUN SERPL-MCNC: 13 MG/DL (ref 6–20)
BUN/CREAT SERPL: 17 (ref 12–20)
CALCIUM SERPL-MCNC: 9.8 MG/DL (ref 8.5–10.1)
CHLORIDE SERPL-SCNC: 103 MMOL/L (ref 97–108)
CHOLEST SERPL-MCNC: 189 MG/DL
CO2 SERPL-SCNC: 27 MMOL/L (ref 21–32)
CREAT SERPL-MCNC: 0.78 MG/DL (ref 0.55–1.02)
CREAT UR-MCNC: 52.1 MG/DL
DIFFERENTIAL METHOD BLD: NORMAL
EOSINOPHIL # BLD: 0.3 K/UL (ref 0–0.4)
EOSINOPHIL NFR BLD: 5 % (ref 0–7)
ERYTHROCYTE [DISTWIDTH] IN BLOOD BY AUTOMATED COUNT: 13.8 % (ref 11.5–14.5)
EST. AVERAGE GLUCOSE BLD GHB EST-MCNC: 183 MG/DL
GLOBULIN SER CALC-MCNC: 4.1 G/DL (ref 2–4)
GLUCOSE SERPL-MCNC: 141 MG/DL (ref 65–100)
HBA1C MFR BLD: 8 % (ref 4–5.6)
HCT VFR BLD AUTO: 40.6 % (ref 35–47)
HDLC SERPL-MCNC: 86 MG/DL
HDLC SERPL: 2.2 (ref 0–5)
HGB BLD-MCNC: 13.4 G/DL (ref 11.5–16)
IMM GRANULOCYTES # BLD AUTO: 0 K/UL (ref 0–0.04)
IMM GRANULOCYTES NFR BLD AUTO: 0 % (ref 0–0.5)
LDLC SERPL CALC-MCNC: 69.4 MG/DL (ref 0–100)
LYMPHOCYTES # BLD: 1.5 K/UL (ref 0.8–3.5)
LYMPHOCYTES NFR BLD: 28 % (ref 12–49)
MCH RBC QN AUTO: 28.6 PG (ref 26–34)
MCHC RBC AUTO-ENTMCNC: 33 G/DL (ref 30–36.5)
MCV RBC AUTO: 86.6 FL (ref 80–99)
MICROALBUMIN UR-MCNC: 3.04 MG/DL
MICROALBUMIN/CREAT UR-RTO: 58 MG/G (ref 0–30)
MONOCYTES # BLD: 0.4 K/UL (ref 0–1)
MONOCYTES NFR BLD: 7 % (ref 5–13)
NEUTS SEG # BLD: 3.1 K/UL (ref 1.8–8)
NEUTS SEG NFR BLD: 59 % (ref 32–75)
NRBC # BLD: 0 K/UL (ref 0–0.01)
NRBC BLD-RTO: 0 PER 100 WBC
PLATELET # BLD AUTO: 293 K/UL (ref 150–400)
POTASSIUM SERPL-SCNC: 4.4 MMOL/L (ref 3.5–5.1)
PROT SERPL-MCNC: 8.3 G/DL (ref 6.4–8.2)
RBC # BLD AUTO: 4.69 M/UL (ref 3.8–5.2)
RBC MORPH BLD: NORMAL
SODIUM SERPL-SCNC: 136 MMOL/L (ref 136–145)
TRIGL SERPL-MCNC: 168 MG/DL
TSH SERPL DL<=0.05 MIU/L-ACNC: 1.14 UIU/ML (ref 0.36–3.74)
VLDLC SERPL CALC-MCNC: 33.6 MG/DL
WBC # BLD AUTO: 5.4 K/UL (ref 3.6–11)

## 2024-03-22 PROCEDURE — 3079F DIAST BP 80-89 MM HG: CPT | Performed by: NURSE PRACTITIONER

## 2024-03-22 PROCEDURE — 99204 OFFICE O/P NEW MOD 45 MIN: CPT | Performed by: NURSE PRACTITIONER

## 2024-03-22 PROCEDURE — 3077F SYST BP >= 140 MM HG: CPT | Performed by: NURSE PRACTITIONER

## 2024-03-22 RX ORDER — CANDESARTAN CILEXETIL AND HYDROCHLOROTHIAZIDE 32; 25 MG/1; MG/1
1 TABLET ORAL DAILY
Qty: 90 TABLET | Refills: 0 | Status: SHIPPED | OUTPATIENT
Start: 2024-03-22 | End: 2024-06-20

## 2024-03-22 RX ORDER — ROSUVASTATIN CALCIUM 20 MG/1
20 TABLET, COATED ORAL DAILY
COMMUNITY
End: 2024-03-22 | Stop reason: SDUPTHER

## 2024-03-22 RX ORDER — SITAGLIPTIN 100 MG/1
100 TABLET, FILM COATED ORAL DAILY
Qty: 90 TABLET | Refills: 0 | Status: SHIPPED | OUTPATIENT
Start: 2024-03-22 | End: 2024-06-20

## 2024-03-22 RX ORDER — LEVOCETIRIZINE DIHYDROCHLORIDE 5 MG/1
5 TABLET, FILM COATED ORAL NIGHTLY
Qty: 90 TABLET | Refills: 0 | Status: SHIPPED | OUTPATIENT
Start: 2024-03-22 | End: 2024-06-20

## 2024-03-22 RX ORDER — ASPIRIN 81 MG/1
81 TABLET, CHEWABLE ORAL DAILY
COMMUNITY

## 2024-03-22 RX ORDER — MOMETASONE FUROATE 50 UG/1
2 SPRAY, METERED NASAL DAILY
Qty: 3 EACH | Refills: 0 | Status: SHIPPED | OUTPATIENT
Start: 2024-03-22 | End: 2024-06-20

## 2024-03-22 RX ORDER — CANDESARTAN CILEXETIL AND HYDROCHLOROTHIAZIDE 32; 25 MG/1; MG/1
1 TABLET ORAL DAILY
COMMUNITY
End: 2024-03-22 | Stop reason: SDUPTHER

## 2024-03-22 RX ORDER — MOMETASONE FUROATE 50 UG/1
2 SPRAY, METERED NASAL DAILY
COMMUNITY
End: 2024-03-22 | Stop reason: SDUPTHER

## 2024-03-22 RX ORDER — SITAGLIPTIN 100 MG/1
100 TABLET, FILM COATED ORAL DAILY
COMMUNITY
Start: 2024-01-29 | End: 2024-03-22 | Stop reason: SDUPTHER

## 2024-03-22 RX ORDER — ROSUVASTATIN CALCIUM 20 MG/1
20 TABLET, COATED ORAL DAILY
Qty: 90 TABLET | Refills: 0 | Status: SHIPPED | OUTPATIENT
Start: 2024-03-22 | End: 2024-06-20

## 2024-03-22 RX ORDER — ZOSTER VACCINE RECOMBINANT, ADJUVANTED 50 MCG/0.5
0.5 KIT INTRAMUSCULAR SEE ADMIN INSTRUCTIONS
Qty: 0.5 ML | Refills: 0 | Status: SHIPPED | OUTPATIENT
Start: 2024-03-22 | End: 2024-09-18

## 2024-03-22 RX ORDER — LEVOCETIRIZINE DIHYDROCHLORIDE 5 MG/1
5 TABLET, FILM COATED ORAL NIGHTLY
COMMUNITY
End: 2024-03-22 | Stop reason: SDUPTHER

## 2024-03-22 SDOH — ECONOMIC STABILITY: INCOME INSECURITY: HOW HARD IS IT FOR YOU TO PAY FOR THE VERY BASICS LIKE FOOD, HOUSING, MEDICAL CARE, AND HEATING?: NOT VERY HARD

## 2024-03-22 SDOH — ECONOMIC STABILITY: FOOD INSECURITY: WITHIN THE PAST 12 MONTHS, YOU WORRIED THAT YOUR FOOD WOULD RUN OUT BEFORE YOU GOT MONEY TO BUY MORE.: NEVER TRUE

## 2024-03-22 SDOH — ECONOMIC STABILITY: FOOD INSECURITY: WITHIN THE PAST 12 MONTHS, THE FOOD YOU BOUGHT JUST DIDN'T LAST AND YOU DIDN'T HAVE MONEY TO GET MORE.: NEVER TRUE

## 2024-03-22 SDOH — ECONOMIC STABILITY: HOUSING INSECURITY
IN THE LAST 12 MONTHS, WAS THERE A TIME WHEN YOU DID NOT HAVE A STEADY PLACE TO SLEEP OR SLEPT IN A SHELTER (INCLUDING NOW)?: NO

## 2024-03-22 ASSESSMENT — ENCOUNTER SYMPTOMS
SINUS PRESSURE: 0
BLOOD IN STOOL: 0
EYE REDNESS: 0
COUGH: 0
CHEST TIGHTNESS: 0
BACK PAIN: 0
CONSTIPATION: 0
GASTROINTESTINAL NEGATIVE: 1
VOMITING: 0
NAUSEA: 0
RHINORRHEA: 0
ABDOMINAL PAIN: 0
RESPIRATORY NEGATIVE: 1
SHORTNESS OF BREATH: 0
SINUS PAIN: 0
DIARRHEA: 0
EYE PAIN: 0

## 2024-03-22 ASSESSMENT — PATIENT HEALTH QUESTIONNAIRE - PHQ9
SUM OF ALL RESPONSES TO PHQ QUESTIONS 1-9: 0
SUM OF ALL RESPONSES TO PHQ QUESTIONS 1-9: 0
2. FEELING DOWN, DEPRESSED OR HOPELESS: NOT AT ALL
SUM OF ALL RESPONSES TO PHQ QUESTIONS 1-9: 0
SUM OF ALL RESPONSES TO PHQ QUESTIONS 1-9: 0
1. LITTLE INTEREST OR PLEASURE IN DOING THINGS: NOT AT ALL
SUM OF ALL RESPONSES TO PHQ9 QUESTIONS 1 & 2: 0

## 2024-03-22 NOTE — PROGRESS NOTES
Assessment and Plan     1. Encounter to establish care: Medical history reviewed. Colon, breast and cervical screening guidelines discussed. Recommended vaccines discussed. Labs ordered.   2. Primary hypertension: Elevated. Will re-start Candesartan/HCTZ, compliance discussed. Low sodium diet recommended. Pt verbalized understanding.   -     CBC with Auto Differential; Future  -     Comprehensive Metabolic Panel; Future  -     TSH; Future  -     candesartan cilexetil-HCTZ (ATACAND HCT) 32-25 MG TABS; Take 1 tablet by mouth daily, Disp-90 tablet, R-0Normal  3. Type 2 diabetes mellitus without complication, without long-term current use of insulin (Former[facility]): Will check glucose level. Will restart Januvia and metformin, compliance discussed. Low carb and sugar diet recommended. Yearly retina exam and foot care discussed. Pt verbalized understanding.   -     Microalbumin / Creatinine Urine Ratio; Future  -     Comprehensive Metabolic Panel; Future  -     Hemoglobin A1C; Future  -     JANUVIA 100 MG tablet; Take 1 tablet by mouth daily, Disp-90 tablet, R-0, DAWNormal  -      DIABETES FOOT EXAM  4. Dyslipidemia: Will restart Crestor. Will check lipids.   -     Lipid Panel; Future  -     Comprehensive Metabolic Panel; Future  -     rosuvastatin (CRESTOR) 20 MG tablet; Take 1 tablet by mouth daily, Disp-90 tablet, R-0Normal  5. Environmental allergies: Re-start Levocetirizine, continue with Nasonex nasal spray.   -     levocetirizine (XYZAL) 5 MG tablet; Take 1 tablet by mouth nightly, Disp-90 tablet, R-0Normal  -     mometasone (NASONEX) 50 MCG/ACT nasal spray; 2 sprays by Each Nostril route daily, Each Nostril, DAILY Starting Fri 3/22/2024, Until Thu 6/20/2024, For 90 days, Disp-3 each, R-0, Normal  6. Class 3 severe obesity due to excess calories with serious comorbidity and body mass index (BMI) of 40.0 to 44.9 in adult (Former[facility]): Lifestyle changes with diet and physical activity discussed.  7. Screening mammogram for breast

## 2024-03-24 LAB
HCV AB SERPL QL IA: NORMAL
HCV IGG SERPL QL IA: NON REACTIVE S/CO RATIO

## 2024-05-01 ENCOUNTER — HOSPITAL ENCOUNTER (OUTPATIENT)
Facility: HOSPITAL | Age: 64
Setting detail: SPECIMEN
Discharge: HOME | End: 2024-05-04
Payer: COMMERCIAL

## 2024-05-01 ENCOUNTER — OFFICE VISIT (OUTPATIENT)
Age: 64
End: 2024-05-01
Payer: COMMERCIAL

## 2024-05-01 ENCOUNTER — CLINICAL DOCUMENTATION (OUTPATIENT)
Age: 64
End: 2024-05-01

## 2024-05-01 VITALS
DIASTOLIC BLOOD PRESSURE: 78 MMHG | HEART RATE: 75 BPM | SYSTOLIC BLOOD PRESSURE: 136 MMHG | OXYGEN SATURATION: 97 % | BODY MASS INDEX: 44.99 KG/M2 | TEMPERATURE: 98.3 F | WEIGHT: 200 LBS | HEIGHT: 56 IN | RESPIRATION RATE: 16 BRPM

## 2024-05-01 DIAGNOSIS — Z00.00 ANNUAL PHYSICAL EXAM: Primary | ICD-10-CM

## 2024-05-01 DIAGNOSIS — I10 PRIMARY HYPERTENSION: ICD-10-CM

## 2024-05-01 DIAGNOSIS — Z23 NEED FOR SHINGLES VACCINE: ICD-10-CM

## 2024-05-01 DIAGNOSIS — Z12.31 SCREENING MAMMOGRAM FOR BREAST CANCER: ICD-10-CM

## 2024-05-01 DIAGNOSIS — E78.5 DYSLIPIDEMIA: ICD-10-CM

## 2024-05-01 DIAGNOSIS — E66.01 CLASS 3 SEVERE OBESITY DUE TO EXCESS CALORIES WITH SERIOUS COMORBIDITY AND BODY MASS INDEX (BMI) OF 40.0 TO 44.9 IN ADULT (HCC): ICD-10-CM

## 2024-05-01 DIAGNOSIS — Z91.09 ENVIRONMENTAL ALLERGIES: ICD-10-CM

## 2024-05-01 DIAGNOSIS — E11.9 TYPE 2 DIABETES MELLITUS WITHOUT COMPLICATION, WITHOUT LONG-TERM CURRENT USE OF INSULIN (HCC): ICD-10-CM

## 2024-05-01 DIAGNOSIS — Z12.4 SCREENING FOR CERVICAL CANCER: ICD-10-CM

## 2024-05-01 DIAGNOSIS — Z12.11 SCREENING FOR COLON CANCER: ICD-10-CM

## 2024-05-01 PROBLEM — E66.813 CLASS 3 SEVERE OBESITY DUE TO EXCESS CALORIES WITH SERIOUS COMORBIDITY AND BODY MASS INDEX (BMI) OF 40.0 TO 44.9 IN ADULT: Status: ACTIVE | Noted: 2024-05-01

## 2024-05-01 LAB — HBA1C MFR BLD: 8.5 %

## 2024-05-01 PROCEDURE — 3075F SYST BP GE 130 - 139MM HG: CPT | Performed by: NURSE PRACTITIONER

## 2024-05-01 PROCEDURE — 83036 HEMOGLOBIN GLYCOSYLATED A1C: CPT | Performed by: NURSE PRACTITIONER

## 2024-05-01 PROCEDURE — 99396 PREV VISIT EST AGE 40-64: CPT | Performed by: NURSE PRACTITIONER

## 2024-05-01 PROCEDURE — 88175 CYTOPATH C/V AUTO FLUID REDO: CPT

## 2024-05-01 PROCEDURE — 3078F DIAST BP <80 MM HG: CPT | Performed by: NURSE PRACTITIONER

## 2024-05-01 PROCEDURE — 87624 HPV HI-RISK TYP POOLED RSLT: CPT

## 2024-05-01 RX ORDER — DULAGLUTIDE 0.75 MG/.5ML
0.75 INJECTION, SOLUTION SUBCUTANEOUS WEEKLY
Qty: 2 ML | Refills: 1 | Status: SHIPPED | OUTPATIENT
Start: 2024-05-01 | End: 2024-06-30

## 2024-05-01 RX ORDER — OMEGA-3-ACID ETHYL ESTERS 1 G/1
2 CAPSULE, LIQUID FILLED ORAL 2 TIMES DAILY
Qty: 60 CAPSULE | Refills: 3 | Status: SHIPPED | OUTPATIENT
Start: 2024-05-01

## 2024-05-01 RX ORDER — CANDESARTAN CILEXETIL AND HYDROCHLOROTHIAZIDE 32; 25 MG/1; MG/1
1 TABLET ORAL DAILY
Qty: 90 TABLET | Refills: 0 | Status: SHIPPED | OUTPATIENT
Start: 2024-05-01 | End: 2024-07-30

## 2024-05-01 RX ORDER — MOMETASONE FUROATE 50 UG/1
2 SPRAY, METERED NASAL DAILY
Qty: 3 EACH | Refills: 0 | Status: SHIPPED | OUTPATIENT
Start: 2024-05-01 | End: 2024-07-30

## 2024-05-01 RX ORDER — ZOSTER VACCINE RECOMBINANT, ADJUVANTED 50 MCG/0.5
0.5 KIT INTRAMUSCULAR SEE ADMIN INSTRUCTIONS
Qty: 0.5 ML | Refills: 0 | Status: SHIPPED | OUTPATIENT
Start: 2024-05-01 | End: 2024-10-28

## 2024-05-01 ASSESSMENT — PATIENT HEALTH QUESTIONNAIRE - PHQ9
SUM OF ALL RESPONSES TO PHQ QUESTIONS 1-9: 0
SUM OF ALL RESPONSES TO PHQ9 QUESTIONS 1 & 2: 0
1. LITTLE INTEREST OR PLEASURE IN DOING THINGS: NOT AT ALL
SUM OF ALL RESPONSES TO PHQ QUESTIONS 1-9: 0
2. FEELING DOWN, DEPRESSED OR HOPELESS: NOT AT ALL

## 2024-05-01 NOTE — PROGRESS NOTES
Margie Yo (Key: [id number])  PA Case ID #: [id number]  Rx #: [id number]  Need Help? Call us at [phone]  Outcome  Approved today  CaseId:[id number];Status:Approved;Review Type:Prior Auth;Coverage Start Date:05/01/2024;Coverage End Date:05/01/2027;

## 2024-05-01 NOTE — PROGRESS NOTES
\"Have you been to the ER, urgent care clinic since your last visit?  Hospitalized since your last visit?\"    NO    “Have you seen or consulted any other health care providers outside of [facility] since your last visit?”    NO    Have you had a mammogram?”   NO      Click Here for Release of Records Request   
  Transportation Needs: Unknown (3/22/2024)    PRAPARE - Transportation     Lack of Transportation (Medical): Not on file     Lack of Transportation (Non-Medical): No   Physical Activity: Not on file   Stress: Not on file   Social Connections: Not on file   Intimate Partner Violence: Not on file   Housing Stability: Unknown (3/22/2024)    Housing Stability Vital Sign     Unable to Pay for Housing in the Last Year: Not on file     Number of Places Lived in the Last Year: Not on file     Unstable Housing in the Last Year: No     Family History   Problem Relation Age of Onset    Asthma Mother     Cirrhosis Father     Diabetes Sister         x 2    Diabetes type 2  Sister     No Known Problems Brother         accidental death       Review of Systems - History obtained from the patient  General ROS: negative for - night sweats, weight gain or weight loss  Cardiovascular ROS: no chest pain, dyspnea on exertion, edema  GYN ROS: no breast pain or new or enlarging lumps on self exam, no vaginal bleeding, no discharge or pelvic pain.    Physical exam  Blood pressure 136/78, pulse 75, temperature 98.3 °F (36.8 °C), temperature source Oral, resp. rate 16, height 1.422 m (4' 8\"), weight 90.7 kg (200 lb), SpO2 97 %.  Wt Readings from Last 3 Encounters:   05/01/24 90.7 kg (200 lb)   03/22/24 90.1 kg (198 lb 9.6 oz)     she appears well, alert and oriented x 3, pleasant and cooperative. Vitals as noted.   No rashes or significant lesions.  Neck supple and free of adenopathy, or masses.  No thyromegaly or carotid bruits. Cranial nerves normal. Lungs are clear to auscultation. Heart sounds are normal with no murmurs, clicks, gallops or rubs. Abdomen is soft, non- tender, with no masses or organomegaly. Extremities, peripheral pulses and reflexes are normal.  .   BREAST EXAM: breasts appear normal, no suspicious masses, no skin or nipple changes or axillary nodes, no axillary lymphadenopathy, risk and benefit of breast self-exam was

## 2024-05-08 DIAGNOSIS — E78.5 DYSLIPIDEMIA: ICD-10-CM

## 2024-05-08 RX ORDER — OMEGA-3-ACID ETHYL ESTERS 1 G/1
2 CAPSULE, LIQUID FILLED ORAL 2 TIMES DAILY
Qty: 360 CAPSULE | Refills: 1 | Status: SHIPPED | OUTPATIENT
Start: 2024-05-08

## 2024-06-16 DIAGNOSIS — E78.5 DYSLIPIDEMIA: ICD-10-CM

## 2024-06-17 DIAGNOSIS — Z91.09 ENVIRONMENTAL ALLERGIES: ICD-10-CM

## 2024-06-17 DIAGNOSIS — E11.9 TYPE 2 DIABETES MELLITUS WITHOUT COMPLICATION, WITHOUT LONG-TERM CURRENT USE OF INSULIN (HCC): ICD-10-CM

## 2024-06-17 RX ORDER — LEVOCETIRIZINE DIHYDROCHLORIDE 5 MG/1
5 TABLET, FILM COATED ORAL NIGHTLY
Qty: 90 TABLET | Refills: 0 | Status: SHIPPED | OUTPATIENT
Start: 2024-06-17

## 2024-06-17 RX ORDER — SITAGLIPTIN 100 MG/1
100 TABLET, FILM COATED ORAL DAILY
Qty: 90 TABLET | Refills: 0 | Status: SHIPPED | OUTPATIENT
Start: 2024-06-17

## 2024-06-17 RX ORDER — ROSUVASTATIN CALCIUM 20 MG/1
20 TABLET, COATED ORAL DAILY
Qty: 90 TABLET | Refills: 0 | Status: SHIPPED | OUTPATIENT
Start: 2024-06-17

## 2024-09-11 DIAGNOSIS — Z91.09 ENVIRONMENTAL ALLERGIES: ICD-10-CM

## 2024-09-11 RX ORDER — MOMETASONE FUROATE MONOHYDRATE 50 UG/1
2 SPRAY, METERED NASAL DAILY
Qty: 2 EACH | Refills: 1 | Status: SHIPPED | OUTPATIENT
Start: 2024-09-11

## 2024-09-23 DIAGNOSIS — E78.5 DYSLIPIDEMIA: ICD-10-CM

## 2024-09-23 RX ORDER — ROSUVASTATIN CALCIUM 20 MG/1
20 TABLET, COATED ORAL DAILY
Qty: 90 TABLET | Refills: 0 | Status: SHIPPED | OUTPATIENT
Start: 2024-09-23

## 2024-09-24 DIAGNOSIS — E11.9 TYPE 2 DIABETES MELLITUS WITHOUT COMPLICATION, WITHOUT LONG-TERM CURRENT USE OF INSULIN (HCC): ICD-10-CM

## 2024-09-24 DIAGNOSIS — I10 PRIMARY HYPERTENSION: ICD-10-CM

## 2024-09-24 DIAGNOSIS — Z91.09 ENVIRONMENTAL ALLERGIES: ICD-10-CM

## 2024-09-24 RX ORDER — CANDESARTAN CILEXETIL AND HYDROCHLOROTHIAZIDE 32; 25 MG/1; MG/1
1 TABLET ORAL DAILY
Qty: 90 TABLET | Refills: 0 | Status: SHIPPED | OUTPATIENT
Start: 2024-09-24

## 2024-09-24 RX ORDER — SITAGLIPTIN 100 MG/1
100 TABLET, FILM COATED ORAL DAILY
Qty: 90 TABLET | Refills: 0 | Status: SHIPPED | OUTPATIENT
Start: 2024-09-24

## 2024-09-24 RX ORDER — LEVOCETIRIZINE DIHYDROCHLORIDE 5 MG/1
5 TABLET, FILM COATED ORAL NIGHTLY
Qty: 90 TABLET | Refills: 0 | Status: SHIPPED | OUTPATIENT
Start: 2024-09-24

## 2024-11-10 DIAGNOSIS — E78.5 DYSLIPIDEMIA: ICD-10-CM

## 2024-11-12 RX ORDER — OMEGA-3-ACID ETHYL ESTERS 1 G/1
2 CAPSULE, LIQUID FILLED ORAL 2 TIMES DAILY
Qty: 360 CAPSULE | Refills: 1 | Status: SHIPPED | OUTPATIENT
Start: 2024-11-12